# Patient Record
Sex: FEMALE | Race: WHITE | NOT HISPANIC OR LATINO | URBAN - NONMETROPOLITAN AREA
[De-identification: names, ages, dates, MRNs, and addresses within clinical notes are randomized per-mention and may not be internally consistent; named-entity substitution may affect disease eponyms.]

---

## 2018-04-12 ENCOUNTER — CLINICAL SUPPORT (OUTPATIENT)
Dept: FAMILY MEDICINE CLINIC | Facility: CLINIC | Age: 28
End: 2018-04-12

## 2018-04-12 VITALS
OXYGEN SATURATION: 98 % | HEART RATE: 84 BPM | WEIGHT: 205 LBS | BODY MASS INDEX: 36.32 KG/M2 | TEMPERATURE: 97.4 F | SYSTOLIC BLOOD PRESSURE: 134 MMHG | HEIGHT: 63 IN | DIASTOLIC BLOOD PRESSURE: 78 MMHG

## 2018-04-12 DIAGNOSIS — Z02.89 ENCOUNTER FOR EXAMINATION REQUIRED BY DEPARTMENT OF TRANSPORTATION (DOT): Primary | ICD-10-CM

## 2018-04-12 PROCEDURE — DOTPHY: Performed by: NURSE PRACTITIONER

## 2018-04-12 NOTE — PROGRESS NOTES
Brigitte Go is a 27 y.o. female who presents to the office for a DOT Exam. See Federal DOT examination form for details of this visit.

## 2018-04-17 ENCOUNTER — LAB (OUTPATIENT)
Dept: LAB | Facility: OTHER | Age: 28
End: 2018-04-17

## 2018-04-17 ENCOUNTER — OFFICE VISIT (OUTPATIENT)
Dept: FAMILY MEDICINE CLINIC | Facility: CLINIC | Age: 28
End: 2018-04-17

## 2018-04-17 VITALS
HEIGHT: 63 IN | WEIGHT: 206.6 LBS | DIASTOLIC BLOOD PRESSURE: 80 MMHG | TEMPERATURE: 98 F | BODY MASS INDEX: 36.61 KG/M2 | HEART RATE: 86 BPM | SYSTOLIC BLOOD PRESSURE: 130 MMHG

## 2018-04-17 DIAGNOSIS — D64.9 ANEMIA, UNSPECIFIED TYPE: ICD-10-CM

## 2018-04-17 DIAGNOSIS — R53.83 FATIGUE, UNSPECIFIED TYPE: Primary | ICD-10-CM

## 2018-04-17 DIAGNOSIS — R53.83 FATIGUE, UNSPECIFIED TYPE: ICD-10-CM

## 2018-04-17 DIAGNOSIS — E66.9 OBESITY (BMI 30-39.9): ICD-10-CM

## 2018-04-17 LAB
ALBUMIN SERPL-MCNC: 4.2 G/DL (ref 3.2–5.5)
ALBUMIN/GLOB SERPL: 1.2 G/DL (ref 1–3)
ALP SERPL-CCNC: 63 U/L (ref 15–121)
ALT SERPL W P-5'-P-CCNC: 19 U/L (ref 10–60)
ANION GAP SERPL CALCULATED.3IONS-SCNC: 9 MMOL/L (ref 5–15)
AST SERPL-CCNC: 19 U/L (ref 10–60)
BASOPHILS # BLD AUTO: 0.02 10*3/MM3 (ref 0–0.2)
BASOPHILS NFR BLD AUTO: 0.2 % (ref 0–2)
BILIRUB SERPL-MCNC: 0.6 MG/DL (ref 0.2–1)
BUN BLD-MCNC: 15 MG/DL (ref 8–25)
BUN/CREAT SERPL: 18.8 (ref 7–25)
CALCIUM SPEC-SCNC: 9.8 MG/DL (ref 8.4–10.8)
CHLORIDE SERPL-SCNC: 103 MMOL/L (ref 100–112)
CO2 SERPL-SCNC: 27 MMOL/L (ref 20–32)
CREAT BLD-MCNC: 0.8 MG/DL (ref 0.4–1.3)
DEPRECATED RDW RBC AUTO: 42.7 FL (ref 36.4–46.3)
EOSINOPHIL # BLD AUTO: 0.4 10*3/MM3 (ref 0–0.7)
EOSINOPHIL NFR BLD AUTO: 4.7 % (ref 0–7)
ERYTHROCYTE [DISTWIDTH] IN BLOOD BY AUTOMATED COUNT: 13.8 % (ref 11.5–14.5)
FERRITIN SERPL-MCNC: 21.3 NG/ML (ref 6.2–137)
FOLATE SERPL-MCNC: 13.6 NG/ML (ref 2.76–21)
GFR SERPL CREATININE-BSD FRML MDRD: 85 ML/MIN/1.73 (ref 71–165)
GLOBULIN UR ELPH-MCNC: 3.6 GM/DL (ref 2.5–4.6)
GLUCOSE BLD-MCNC: 90 MG/DL (ref 70–100)
HCT VFR BLD AUTO: 37.1 % (ref 35–45)
HGB BLD-MCNC: 11.9 G/DL (ref 12–15.5)
IRON 24H UR-MRATE: 45 MCG/DL (ref 37–170)
IRON SATN MFR SERPL: 12 % (ref 15–50)
LYMPHOCYTES # BLD AUTO: 2.12 10*3/MM3 (ref 0.6–4.2)
LYMPHOCYTES NFR BLD AUTO: 24.9 % (ref 10–50)
MCH RBC QN AUTO: 27.5 PG (ref 26.5–34)
MCHC RBC AUTO-ENTMCNC: 32.1 G/DL (ref 31.4–36)
MCV RBC AUTO: 85.7 FL (ref 80–98)
MONOCYTES # BLD AUTO: 0.68 10*3/MM3 (ref 0–0.9)
MONOCYTES NFR BLD AUTO: 8 % (ref 0–12)
NEUTROPHILS # BLD AUTO: 5.3 10*3/MM3 (ref 2–8.6)
NEUTROPHILS NFR BLD AUTO: 62.2 % (ref 37–80)
PLATELET # BLD AUTO: 325 10*3/MM3 (ref 150–450)
PMV BLD AUTO: 9.3 FL (ref 8–12)
POTASSIUM BLD-SCNC: 4.3 MMOL/L (ref 3.4–5.4)
PROT SERPL-MCNC: 7.8 G/DL (ref 6.7–8.2)
RBC # BLD AUTO: 4.33 10*6/MM3 (ref 3.77–5.16)
SODIUM BLD-SCNC: 139 MMOL/L (ref 134–146)
T4 FREE SERPL-MCNC: 0.81 NG/DL (ref 0.78–2.19)
TIBC SERPL-MCNC: 386 MCG/DL (ref 265–497)
TSH SERPL DL<=0.05 MIU/L-ACNC: 1.98 MIU/ML (ref 0.46–4.68)
VIT B12 BLD-MCNC: 759 PG/ML (ref 239–931)
WBC NRBC COR # BLD: 8.52 10*3/MM3 (ref 3.2–9.8)

## 2018-04-17 PROCEDURE — 99213 OFFICE O/P EST LOW 20 MIN: CPT | Performed by: NURSE PRACTITIONER

## 2018-04-17 PROCEDURE — 84439 ASSAY OF FREE THYROXINE: CPT | Performed by: NURSE PRACTITIONER

## 2018-04-17 PROCEDURE — 84481 FREE ASSAY (FT-3): CPT | Performed by: NURSE PRACTITIONER

## 2018-04-17 PROCEDURE — 84443 ASSAY THYROID STIM HORMONE: CPT | Performed by: NURSE PRACTITIONER

## 2018-04-17 PROCEDURE — 80053 COMPREHEN METABOLIC PANEL: CPT | Performed by: NURSE PRACTITIONER

## 2018-04-17 PROCEDURE — 82746 ASSAY OF FOLIC ACID SERUM: CPT | Performed by: NURSE PRACTITIONER

## 2018-04-17 PROCEDURE — 83540 ASSAY OF IRON: CPT | Performed by: NURSE PRACTITIONER

## 2018-04-17 PROCEDURE — 85025 COMPLETE CBC W/AUTO DIFF WBC: CPT | Performed by: NURSE PRACTITIONER

## 2018-04-17 PROCEDURE — 83550 IRON BINDING TEST: CPT | Performed by: NURSE PRACTITIONER

## 2018-04-17 PROCEDURE — 82728 ASSAY OF FERRITIN: CPT | Performed by: NURSE PRACTITIONER

## 2018-04-17 PROCEDURE — 82607 VITAMIN B-12: CPT | Performed by: NURSE PRACTITIONER

## 2018-04-17 NOTE — PROGRESS NOTES
Subjective   Brigitte Go is a 27 y.o. female. Patient here today with complaints of Establish Care and Anemia  pt here today with complaints of fatigue, reports hx of anemia. Has taken OTC vitamins without relief of symptoms. Has IUD for contraception.     Vitals:    18 0915   BP: 130/80   Pulse: 86   Temp: 98 °F (36.7 °C)     Past Medical History:   Diagnosis Date   • Obesity complicating pregnancy, childbirth, or puerperium, antepartum     Obesity complicating pregnancy, childbirth, or the puerperium, delivered, with or without mention of antepartum condition      • Postpartum care and examination      care status      • Vulvovaginitis      Fatigue   This is a new problem. The current episode started more than 1 month ago. The problem occurs constantly. The problem has been unchanged. Associated symptoms include fatigue. Pertinent negatives include no abdominal pain, anorexia, arthralgias, change in bowel habit, chest pain, chills, congestion, coughing, diaphoresis, fever, headaches, joint swelling, myalgias, nausea, neck pain, numbness, rash, sore throat, swollen glands, urinary symptoms, vertigo, visual change, vomiting or weakness. The symptoms are aggravated by stress. She has tried rest and sleep (OTC vitamins) for the symptoms. The treatment provided no relief.        The following portions of the patient's history were reviewed and updated as appropriate: allergies, current medications, past family history, past medical history, past social history, past surgical history and problem list.    Review of Systems   Constitutional: Positive for fatigue. Negative for chills, diaphoresis and fever.   HENT: Negative.  Negative for congestion and sore throat.    Eyes: Negative.    Respiratory: Negative.  Negative for cough.    Cardiovascular: Negative.  Negative for chest pain.   Gastrointestinal: Negative.  Negative for abdominal pain, anorexia, change in bowel habit, nausea and vomiting.    Endocrine: Negative.    Genitourinary: Negative.    Musculoskeletal: Negative.  Negative for arthralgias, joint swelling, myalgias and neck pain.   Skin: Negative.  Negative for rash.   Allergic/Immunologic: Negative.    Neurological: Negative.  Negative for vertigo, weakness, numbness and headaches.   Hematological: Negative.    Psychiatric/Behavioral: Negative.        Objective   Physical Exam   Constitutional: She is oriented to person, place, and time. She appears well-developed and well-nourished. No distress.   HENT:   Head: Normocephalic and atraumatic.   Neck: Neck supple. No thyromegaly present.   Cardiovascular: Normal rate, regular rhythm and normal heart sounds.  Exam reveals no gallop and no friction rub.    No murmur heard.  Pulmonary/Chest: Effort normal and breath sounds normal. She has no wheezes. She has no rales.   Abdominal: Soft. Bowel sounds are normal. She exhibits no distension and no mass. There is no tenderness. There is no rebound and no guarding. No hernia.   Lymphadenopathy:     She has no cervical adenopathy.   Neurological: She is alert and oriented to person, place, and time.   Skin: Skin is warm and dry. No rash noted. She is not diaphoretic. No erythema. No pallor.   Psychiatric: She has a normal mood and affect. Her behavior is normal.   Nursing note and vitals reviewed.      Assessment/Plan   Brigitte was seen today for establish care and anemia.    Diagnoses and all orders for this visit:    Fatigue, unspecified type  -     CBC & Differential; Future  -     Comprehensive metabolic panel; Future  -     TSH; Future  -     T4, Free; Future  -     T3, free; Future  -     Ferritin; Future  -     Folate; Future  -     Iron and TIBC; Future  -     Vitamin B12; Future    Obesity (BMI 30-39.9)    Anemia, unspecified type  Comments:  reports history of anemia    BMI 36.0-36.9,adult    Patient's Body mass index is 36.6 kg/m². BMI is 36.6.  I will obtain above labs and inform her of results,  if all negative she wants to consider weight management options   She will follow up after tests for results   She is aware and is in agreement to this plan   Can continue OTC vitamins daily at present   All questions and concerns are addressed with understanding noted.

## 2018-04-19 LAB — T3FREE SERPL-MCNC: 3.5 PG/ML (ref 2–4.4)

## 2018-04-23 ENCOUNTER — TELEPHONE (OUTPATIENT)
Dept: FAMILY MEDICINE CLINIC | Facility: CLINIC | Age: 28
End: 2018-04-23

## 2018-04-23 NOTE — TELEPHONE ENCOUNTER
----- Message from LUNA Cotton sent at 4/22/2018  2:46 PM CDT -----  Inform pt, start ferous sulfate 1 po qd and take with vit c. Repeat iron studies in 3 months

## 2018-06-26 ENCOUNTER — LAB REQUISITION (OUTPATIENT)
Dept: LAB | Facility: OTHER | Age: 28
End: 2018-06-26

## 2018-06-26 DIAGNOSIS — Z00.00 ROUTINE GENERAL MEDICAL EXAMINATION AT A HEALTH CARE FACILITY: ICD-10-CM

## 2018-06-26 PROCEDURE — UDS COCC - COLLECTION FEE ONLY: Performed by: INTERNAL MEDICINE

## 2018-10-25 ENCOUNTER — TRANSCRIBE ORDERS (OUTPATIENT)
Dept: LAB | Facility: OTHER | Age: 28
End: 2018-10-25

## 2018-10-25 ENCOUNTER — LAB (OUTPATIENT)
Dept: LAB | Facility: OTHER | Age: 28
End: 2018-10-25

## 2018-10-25 DIAGNOSIS — N92.5 OTHER SPECIFIED IRREGULAR MENSTRUATION: Primary | ICD-10-CM

## 2018-10-25 DIAGNOSIS — N94.12 DEEP DYSPAREUNIA: ICD-10-CM

## 2018-10-25 DIAGNOSIS — N92.5 OTHER SPECIFIED IRREGULAR MENSTRUATION: ICD-10-CM

## 2018-10-25 LAB
BASOPHILS # BLD AUTO: 0.02 10*3/MM3 (ref 0–0.2)
BASOPHILS NFR BLD AUTO: 0.2 % (ref 0–2)
CREAT BLD-MCNC: 0.92 MG/DL (ref 0.52–1.04)
DEPRECATED RDW RBC AUTO: 43 FL (ref 36.4–46.3)
EOSINOPHIL # BLD AUTO: 0.38 10*3/MM3 (ref 0–0.7)
EOSINOPHIL NFR BLD AUTO: 4.5 % (ref 0–7)
ERYTHROCYTE [DISTWIDTH] IN BLOOD BY AUTOMATED COUNT: 13.7 % (ref 11.5–14.5)
ERYTHROCYTE [SEDIMENTATION RATE] IN BLOOD: 35 MM/HR (ref 0–20)
GFR SERPL CREATININE-BSD FRML MDRD: 72 ML/MIN/1.73 (ref 71–165)
GLUCOSE BLD-MCNC: 94 MG/DL (ref 74–99)
HCT VFR BLD AUTO: 36.9 % (ref 35–45)
HGB BLD-MCNC: 11.5 G/DL (ref 12–15.5)
LYMPHOCYTES # BLD AUTO: 2.02 10*3/MM3 (ref 0.6–4.2)
LYMPHOCYTES NFR BLD AUTO: 23.8 % (ref 10–50)
MCH RBC QN AUTO: 27.6 PG (ref 26.5–34)
MCHC RBC AUTO-ENTMCNC: 31.2 G/DL (ref 31.4–36)
MCV RBC AUTO: 88.7 FL (ref 80–98)
MONOCYTES # BLD AUTO: 0.47 10*3/MM3 (ref 0–0.9)
MONOCYTES NFR BLD AUTO: 5.5 % (ref 0–12)
NEUTROPHILS # BLD AUTO: 5.61 10*3/MM3 (ref 2–8.6)
NEUTROPHILS NFR BLD AUTO: 66 % (ref 37–80)
PLATELET # BLD AUTO: 326 10*3/MM3 (ref 150–450)
PMV BLD AUTO: 8.9 FL (ref 8–12)
RBC # BLD AUTO: 4.16 10*6/MM3 (ref 3.77–5.16)
WBC NRBC COR # BLD: 8.5 10*3/MM3 (ref 3.2–9.8)

## 2018-10-25 PROCEDURE — 82565 ASSAY OF CREATININE: CPT | Performed by: INTERNAL MEDICINE

## 2018-10-25 PROCEDURE — 85025 COMPLETE CBC W/AUTO DIFF WBC: CPT | Performed by: INTERNAL MEDICINE

## 2018-10-25 PROCEDURE — 85651 RBC SED RATE NONAUTOMATED: CPT | Performed by: INTERNAL MEDICINE

## 2018-10-25 PROCEDURE — 82947 ASSAY GLUCOSE BLOOD QUANT: CPT | Performed by: INTERNAL MEDICINE

## 2018-10-25 PROCEDURE — 84443 ASSAY THYROID STIM HORMONE: CPT | Performed by: OBSTETRICS & GYNECOLOGY

## 2018-10-27 LAB — TSH SERPL-ACNC: 1.5 UIU/ML (ref 0.45–4.5)

## 2019-03-11 ENCOUNTER — CLINICAL SUPPORT (OUTPATIENT)
Dept: FAMILY MEDICINE CLINIC | Facility: CLINIC | Age: 29
End: 2019-03-11

## 2019-03-11 VITALS
HEIGHT: 63 IN | SYSTOLIC BLOOD PRESSURE: 112 MMHG | WEIGHT: 228 LBS | DIASTOLIC BLOOD PRESSURE: 70 MMHG | BODY MASS INDEX: 40.4 KG/M2 | HEART RATE: 101 BPM

## 2019-03-11 DIAGNOSIS — Z02.89 ENCOUNTER FOR EXAMINATION REQUIRED BY DEPARTMENT OF TRANSPORTATION (DOT): Primary | ICD-10-CM

## 2019-03-11 PROCEDURE — DOTPHY: Performed by: NURSE PRACTITIONER

## 2019-03-11 NOTE — PROGRESS NOTES
Brigitte Go is a 28 y.o. female who presents to the office for a DOT Exam. See Federal DOT examination form for details of this visit.

## 2019-04-08 ENCOUNTER — OFFICE VISIT (OUTPATIENT)
Dept: OBSTETRICS AND GYNECOLOGY | Facility: CLINIC | Age: 29
End: 2019-04-08

## 2019-04-08 VITALS
HEIGHT: 63 IN | HEART RATE: 82 BPM | BODY MASS INDEX: 39.73 KG/M2 | DIASTOLIC BLOOD PRESSURE: 74 MMHG | SYSTOLIC BLOOD PRESSURE: 138 MMHG | WEIGHT: 224.2 LBS

## 2019-04-08 DIAGNOSIS — Z30.431 IUD CHECK UP: ICD-10-CM

## 2019-04-08 DIAGNOSIS — R10.2 PELVIC PAIN: Primary | ICD-10-CM

## 2019-04-08 DIAGNOSIS — N89.8 VAGINAL LEUKORRHEA: ICD-10-CM

## 2019-04-08 DIAGNOSIS — N92.1 PROLONGED MENSTRUATION: ICD-10-CM

## 2019-04-08 PROCEDURE — 99214 OFFICE O/P EST MOD 30 MIN: CPT | Performed by: NURSE PRACTITIONER

## 2019-04-08 PROCEDURE — 87210 SMEAR WET MOUNT SALINE/INK: CPT | Performed by: NURSE PRACTITIONER

## 2019-04-08 RX ORDER — FLUCONAZOLE 150 MG/1
150 TABLET ORAL ONCE
Qty: 2 TABLET | Refills: 0 | Status: SHIPPED | OUTPATIENT
Start: 2019-04-08 | End: 2019-04-08

## 2019-04-08 RX ORDER — DOXYCYCLINE 100 MG/1
100 CAPSULE ORAL 2 TIMES DAILY
Qty: 14 CAPSULE | Refills: 0 | Status: SHIPPED | OUTPATIENT
Start: 2019-04-08 | End: 2019-04-15

## 2019-04-08 RX ORDER — METRONIDAZOLE 500 MG/1
500 TABLET ORAL 2 TIMES DAILY
Qty: 14 TABLET | Refills: 0 | Status: SHIPPED | OUTPATIENT
Start: 2019-04-08 | End: 2019-04-15

## 2019-04-08 NOTE — PROGRESS NOTES
"Subjective   Brigitte Go is a 28 y.o. female.     History of Present Illness   Pt presents with concerns about her Paragard IUD in relation to sharp LLQ pain x 1 month and irregular bleeding x 2 months. Pt states [facility] due to fear of weight gain on hormonal regimens. Pt had previously done fine with it and had regular periods. In the Fall, pt began having painful intercourse and LLQ pain. She saw Dr. Logan. She states she was told she had a \"mild case of PCOS but didn't need treatment since she was done having children\". She tells me Dr. Logan wanted to remove her IUD because her \"labs showed inflammation.\" Labs reviewed show elevated Sed Rate of 35. WBC 8.5. TSH WNL. No labs ordered that would indicate or evaluate PCOS. Pt also denies having an ultrasound.     Pt states she went 3 months without a period after 11/15/18. Then has bled every day for the last 2 months. Not very heavy but constant. Denies discharge. Refuses STI testing. Pain is described as sharp, LLQ , comes and goes throughout the day. Has not had any pain today.      The following portions of the patient's history were reviewed and updated as appropriate: allergies, current medications, past family history, past medical history, past social history, past surgical history and problem list.    Review of Systems   Constitutional: Negative.  Negative for chills and fever.   Respiratory: Negative.    Cardiovascular: Negative.    Gastrointestinal: Negative.  Negative for constipation, diarrhea, nausea and vomiting.   Genitourinary: Positive for dyspareunia, menstrual problem and pelvic pain. Negative for urinary incontinence, decreased urine volume, difficulty urinating, dysuria, flank pain, frequency, genital sores, hematuria, urgency, vaginal discharge and vaginal pain.   Neurological: Negative for dizziness, syncope and light-headedness.       Objective    Vitals:    04/08/19 0941   BP: " 138/74   Pulse: 82         04/08/19  0941   Weight: 102 kg (224 lb 3.2 oz)     Body mass index is 39.72 kg/m².    Physical Exam   Constitutional: She is oriented to person, place, and time. She appears well-developed and well-nourished. She is obese.  Cardiovascular: Normal rate, regular rhythm and normal heart sounds.   Pulmonary/Chest: Effort normal and breath sounds normal.   Abdominal: Soft. Bowel sounds are normal. There is tenderness in the left lower quadrant. There is no rigidity and no guarding.       Genitourinary: Uterus normal and cervix normal. There is no rash, tenderness, lesion or injury on the right labia. There is no rash, tenderness, lesion or injury on the left labia. Cervix does not exhibit motion tenderness. IUD strings visualized: 1.5cm out of cervical os. Right adnexum displays no mass, no tenderness and no fullness. Left adnexum displays no mass, no tenderness and no fullness. Vagina exhibits no lesion and no loss of rugae. No erythema, tenderness or bleeding in the vagina. No foreign body in the vagina. No signs of injury around the vagina. Vaginal discharge (copious, thin, yellow discharge, malodorous, wet prep obtained. Pt refuses STI testing. ) found.   Genitourinary Comments: Wet prep: positive for clue cells and WBC TNTC (sheets of WBC), no yeast buds, hyphae or trichomonads. Evaluated by JUAN Bailey. Tenderness not reproduced on exam.    Neurological: She is alert and oriented to person, place, and time.   Vitals reviewed.    Lab Results   Component Value Date    TSH 1.500 10/25/2018     Lab Results   Component Value Date    WBC 8.50 10/25/2018    HGB 11.5 (L) 10/25/2018    HCT 36.9 10/25/2018    MCV 88.7 10/25/2018     10/25/2018     Lab Results   Component Value Date    GLUCOSE 94 10/25/2018    BUN 15 04/17/2018    CREATININE 0.92 10/25/2018    EGFRIFNONA 72 10/25/2018    BCR 18.8 04/17/2018    K 4.3 04/17/2018    CO2 27.0 04/17/2018    CALCIUM 9.8 04/17/2018     ALBUMIN 4.20 04/17/2018    AST 19 04/17/2018    ALT 19 04/17/2018         Assessment/Plan   Brigitte was seen today for iud check.    Diagnoses and all orders for this visit:    Pelvic pain  -     US Non-ob Transvaginal; Future    Prolonged menstruation  -     US Non-ob Transvaginal; Future    IUD check up  -     US Non-ob Transvaginal; Future    Vaginal leukorrhea  -     fluconazole (DIFLUCAN) 150 MG tablet; Take 1 tablet by mouth 1 (One) Time for 1 dose. Repeat dose in 72 hours if still symptomatic  -     metroNIDAZOLE (FLAGYL) 500 MG tablet; Take 1 tablet by mouth 2 (Two) Times a Day for 7 days. No alcohol up to 48 hours after last dose  -     doxycycline (MONODOX) 100 MG capsule; Take 1 capsule by mouth 2 (Two) Times a Day for 7 days.    I discussed findings on exam and wet prep with pt. Consistent with BV from clue cells but excessive WBC indicates inflammation. I recommend treating with both Flagyl and Doxycycline. If on recheck it is persisting, we will have to check for STI. Take Diflucan on day 3 and 7 to prevent secondary yeast infection.     TVUS to confirm placement, evaluate endometrium and evaluate possible etiologies of LLQ. Scheduled for 4/12/19. I will call with results and discuss next steps.     Briefly discussed PCOS. While pt may have it, outside of irregular bleeding, pt has no other symptoms, nor has she had any diagnostic testing that correlate with this diagnosis.     Pt would prefer to leave IUD in if possible and avoid hormonal measures due to fear of weight gain. I discussed that we may have to do short term hormonal regimens to regulate her cycles. Pt voices understanding.

## 2020-05-11 ENCOUNTER — LAB (OUTPATIENT)
Dept: LAB | Facility: OTHER | Age: 30
End: 2020-05-11

## 2020-05-11 ENCOUNTER — TELEMEDICINE (OUTPATIENT)
Dept: FAMILY MEDICINE CLINIC | Facility: CLINIC | Age: 30
End: 2020-05-11

## 2020-05-11 DIAGNOSIS — Z82.49 FAMILY HISTORY OF PULMONARY EMBOLISM: ICD-10-CM

## 2020-05-11 DIAGNOSIS — R07.89 CHEST WALL PAIN: ICD-10-CM

## 2020-05-11 DIAGNOSIS — R07.81 PLEURITIC PAIN: ICD-10-CM

## 2020-05-11 DIAGNOSIS — R07.89 CHEST WALL PAIN: Primary | ICD-10-CM

## 2020-05-11 LAB
ALBUMIN SERPL-MCNC: 4.1 G/DL (ref 3.5–5)
ALBUMIN/GLOB SERPL: 1.2 G/DL (ref 1.1–1.8)
ALP SERPL-CCNC: 59 U/L (ref 38–126)
ALT SERPL W P-5'-P-CCNC: 17 U/L
ANION GAP SERPL CALCULATED.3IONS-SCNC: 6 MMOL/L (ref 5–15)
AST SERPL-CCNC: 19 U/L (ref 14–36)
BASOPHILS # BLD AUTO: 0.03 10*3/MM3 (ref 0–0.2)
BASOPHILS NFR BLD AUTO: 0.4 % (ref 0–1.5)
BILIRUB SERPL-MCNC: 0.1 MG/DL (ref 0.2–1.3)
BUN BLD-MCNC: 15 MG/DL (ref 7–23)
BUN/CREAT SERPL: 17.2 (ref 7–25)
CALCIUM SPEC-SCNC: 9.1 MG/DL (ref 8.4–10.2)
CHLORIDE SERPL-SCNC: 108 MMOL/L (ref 101–112)
CO2 SERPL-SCNC: 27 MMOL/L (ref 22–30)
CREAT BLD-MCNC: 0.87 MG/DL (ref 0.52–1.04)
D-DIMER, QUANTITATIVE (MAD,POW, STR): 325 NG/ML (FEU)
DEPRECATED RDW RBC AUTO: 46.1 FL (ref 37–54)
EOSINOPHIL # BLD AUTO: 0.22 10*3/MM3 (ref 0–0.4)
EOSINOPHIL NFR BLD AUTO: 3.2 % (ref 0.3–6.2)
ERYTHROCYTE [DISTWIDTH] IN BLOOD BY AUTOMATED COUNT: 14.5 % (ref 12.3–15.4)
GFR SERPL CREATININE-BSD FRML MDRD: 76 ML/MIN/1.73 (ref 64–149)
GLOBULIN UR ELPH-MCNC: 3.5 GM/DL (ref 2.3–3.5)
GLUCOSE BLD-MCNC: 102 MG/DL (ref 70–99)
HCT VFR BLD AUTO: 36.7 % (ref 34–46.6)
HGB BLD-MCNC: 11.5 G/DL (ref 12–15.9)
LYMPHOCYTES # BLD AUTO: 1.5 10*3/MM3 (ref 0.7–3.1)
LYMPHOCYTES NFR BLD AUTO: 21.6 % (ref 19.6–45.3)
MCH RBC QN AUTO: 27.6 PG (ref 26.6–33)
MCHC RBC AUTO-ENTMCNC: 31.3 G/DL (ref 31.5–35.7)
MCV RBC AUTO: 88 FL (ref 79–97)
MONOCYTES # BLD AUTO: 0.51 10*3/MM3 (ref 0.1–0.9)
MONOCYTES NFR BLD AUTO: 7.3 % (ref 5–12)
NEUTROPHILS # BLD AUTO: 4.68 10*3/MM3 (ref 1.7–7)
NEUTROPHILS NFR BLD AUTO: 67.5 % (ref 42.7–76)
PLATELET # BLD AUTO: 311 10*3/MM3 (ref 140–450)
PMV BLD AUTO: 9 FL (ref 6–12)
POTASSIUM BLD-SCNC: 4.4 MMOL/L (ref 3.4–5)
PROT SERPL-MCNC: 7.6 G/DL (ref 6.3–8.6)
RBC # BLD AUTO: 4.17 10*6/MM3 (ref 3.77–5.28)
SODIUM BLD-SCNC: 141 MMOL/L (ref 137–145)
WBC NRBC COR # BLD: 6.94 10*3/MM3 (ref 3.4–10.8)

## 2020-05-11 PROCEDURE — 85379 FIBRIN DEGRADATION QUANT: CPT | Performed by: NURSE PRACTITIONER

## 2020-05-11 PROCEDURE — 80053 COMPREHEN METABOLIC PANEL: CPT | Performed by: NURSE PRACTITIONER

## 2020-05-11 PROCEDURE — 85025 COMPLETE CBC W/AUTO DIFF WBC: CPT | Performed by: NURSE PRACTITIONER

## 2020-05-11 PROCEDURE — 99213 OFFICE O/P EST LOW 20 MIN: CPT | Performed by: NURSE PRACTITIONER

## 2020-05-11 RX ORDER — METHYLPREDNISOLONE 4 MG/1
TABLET ORAL
Qty: 1 EACH | Refills: 0 | Status: SHIPPED | OUTPATIENT
Start: 2020-05-11 | End: 2020-08-11

## 2020-05-11 RX ORDER — IBUPROFEN 800 MG/1
800 TABLET ORAL EVERY 6 HOURS PRN
Qty: 60 TABLET | Refills: 0 | Status: SHIPPED | OUTPATIENT
Start: 2020-05-11 | End: 2020-08-11

## 2020-05-11 NOTE — PROGRESS NOTES
Subjective   Brigitte Go is a 29 y.o. female. Patient here today with complaints of Chest Pain  pt contacted the office today for video visit with complaints of L sided chest wall pain, worse with deep breathing, lying on L side, denies shortness of breath, denies fever, reports is sharp in nature, used tylenol which did not help this pain. Denies smoking, does report a family hx of PE, DVT, states sharp pain worse at night, she has 3 children, denies being on OCPs or HRT, has paragard.     There were no vitals filed for this visit.  There is no height or weight on file to calculate BMI.  Past Medical History:   Diagnosis Date   • Obesity complicating pregnancy, childbirth, or puerperium, antepartum     Obesity complicating pregnancy, childbirth, or the puerperium, delivered, with or without mention of antepartum condition      • Postpartum care and examination      care status      • Urinary tract infection    • Vulvovaginitis      Chest Pain    This is a new problem. The current episode started in the past 7 days. The problem occurs intermittently. The problem has been waxing and waning. The pain is present in the lateral region. The pain is moderate. The quality of the pain is described as sharp. The pain radiates to the left arm. Pertinent negatives include no cough, fever, irregular heartbeat, leg pain, orthopnea, palpitations, PND or shortness of breath. The pain is aggravated by deep breathing. She has tried rest and acetaminophen for the symptoms. The treatment provided no relief. Risk factors include obesity.   Her family medical history is significant for PE. Prior diagnostic workup includes chest x-ray.        The following portions of the patient's history were reviewed and updated as appropriate: allergies, current medications, past family history, past medical history, past social history, past surgical history and problem list.    Review of Systems   Constitutional: Negative.   Negative for fever.   HENT: Negative.    Eyes: Negative.    Respiratory: Negative.  Negative for cough and shortness of breath.    Cardiovascular: Positive for chest pain. Negative for palpitations, orthopnea and PND.   Gastrointestinal: Negative.    Endocrine: Negative.    Genitourinary: Negative.    Musculoskeletal: Negative.    Skin: Negative.    Allergic/Immunologic: Negative.    Neurological: Negative.    Hematological: Negative.    Psychiatric/Behavioral: Negative.        Objective   Physical Exam   Constitutional: She appears well-developed and well-nourished. No distress.   HENT:   Head: Normocephalic and atraumatic.   Right Ear: Hearing and external ear normal.   Left Ear: Hearing and external ear normal.   Eyes: Pupils are equal, round, and reactive to light. Conjunctivae and EOM are normal.   Pulmonary/Chest: Effort normal.   Musculoskeletal: Normal range of motion.   Neurological: She is alert. No cranial nerve deficit. Coordination normal.   Skin: No rash noted. She is not diaphoretic. No erythema. No pallor.   Psychiatric: She has a normal mood and affect.     Physical Exam   Constitutional: She appears well-developed and well-nourished. No distress.   HENT:   Head: Normocephalic and atraumatic.   Right Ear: Hearing and external ear normal.   Left Ear: Hearing and external ear normal.   Eyes: Pupils are equal, round, and reactive to light. Conjunctivae and EOM are normal.   Neck: Neck normal appearance.  Pulmonary/Chest: Effort normal.   Musculoskeletal: Normal range of motion.   Neurological: She is alert. No cranial nerve deficit. Coordination normal.   Skin: No rash noted. She is not diaphoretic. No erythema. No pallor.   Psychiatric: She has a normal mood and affect. She mood appears normal. Her affect is normal. Her behavior is normal. Thought content is normal. She does not express abnormal judgement.       Assessment/Plan   Brigitte was seen today for chest pain.    Diagnoses and all orders for  this visit:    Chest wall pain  -     CBC & Differential; Future  -     XR Chest 2 View  -     D-dimer, Quantitative; Future  -     Comprehensive metabolic panel; Future    Pleuritic pain  -     CBC & Differential; Future  -     XR Chest 2 View  -     D-dimer, Quantitative; Future  -     Comprehensive metabolic panel; Future    Family history of pulmonary embolism  -     XR Chest 2 View  -     D-dimer, Quantitative; Future    Other orders  -     methylPREDNISolone (MEDROL, FRANCISCA,) 4 MG tablet; Take as directed on package instructions.  -     ibuprofen (ADVIL,MOTRIN) 800 MG tablet; Take 1 tablet by mouth Every 6 (Six) Hours As Needed for Mild Pain .    she is advised to have stat labs and cxr as above, will inform of results via phone, she states she will come in to have these drawn as soon as she can in hopes that I will receive results today, she is treated with nsaids and po steroids as above, if symptoms worsen advised to go to ER. Will otherwise let me know if symptoms persist. She is aware and is in agreement to this plan. All questions and concerns are addressed with understanding noted. You have chosen to receive care through a telehealth visit.  Do you consent to use a video/audio connection for your medical care today? yes

## 2020-08-11 ENCOUNTER — OFFICE VISIT (OUTPATIENT)
Dept: FAMILY MEDICINE CLINIC | Facility: CLINIC | Age: 30
End: 2020-08-11

## 2020-08-11 DIAGNOSIS — F41.9 ANXIETY: ICD-10-CM

## 2020-08-11 DIAGNOSIS — R45.86 MOOD SWINGS: ICD-10-CM

## 2020-08-11 DIAGNOSIS — F33.0 MILD EPISODE OF RECURRENT MAJOR DEPRESSIVE DISORDER (HCC): Primary | ICD-10-CM

## 2020-08-11 PROCEDURE — 99443 PR PHYS/QHP TELEPHONE EVALUATION 21-30 MIN: CPT | Performed by: NURSE PRACTITIONER

## 2020-08-11 RX ORDER — ESCITALOPRAM OXALATE 10 MG/1
10 TABLET ORAL DAILY
Qty: 30 TABLET | Refills: 1 | Status: SHIPPED | OUTPATIENT
Start: 2020-08-11 | End: 2020-09-14 | Stop reason: SDUPTHER

## 2020-08-11 NOTE — PROGRESS NOTES
"Subjective   Brigitte Go is a 29 y.o. female. Patient here today with complaints of Depression  pt here today for telehealth visit with complaints of low energy, trouble falling asleep but no trouble staying asleep, sadness, crying, anxiety, aches, states she has had these symptoms for years for they are worsening, denies suicidal/homicidal ideations. States she has \"good days and bad days\" but bad days outweigh the good. She has three children , 8 yo, 9 yo and 3 yo. She denies family hx mental health disorders that are being medicated. Has not seen counselors , states she does not have a sitter to allow her to go . She has tried melatonin for sleep which caused her to feel too sedated. Thinks she had postpartum depression after her first child but was not medicated at that time. She denies tobacco, alcohol, rec drug use. States she feels \"overwhelmed\".     There were no vitals filed for this visit.  There is no height or weight on file to calculate BMI.  Past Medical History:   Diagnosis Date   • Obesity complicating pregnancy, childbirth, or puerperium, antepartum     Obesity complicating pregnancy, childbirth, or the puerperium, delivered, with or without mention of antepartum condition      • Postpartum care and examination      care status      • Urinary tract infection    • Vulvovaginitis      Depression   Visit Type: initial  Onset of symptoms: at an unknown time  Progression since onset: gradually worsening  Patient presents with the following symptoms: depressed mood, excessive worry, insomnia, irritability, nervousness/anxiety and psychomotor retardation.  Patient is not experiencing: shortness of breath, suicidal ideas, suicidal planning and thoughts of death.  Frequency of symptoms: most days   Severity: moderate   Sleep quality: poor  Nighttime awakenings: none  Treatment tried: lifestyle changes (melatonin OTC )      Anxiety   Presents for initial visit. Onset was at an unknown time. " Symptoms include depressed mood, excessive worry, insomnia, irritability and nervous/anxious behavior. Patient reports no shortness of breath or suicidal ideas.     Her past medical history is significant for depression. Past treatments include lifestyle changes (melatonin OTC).        The following portions of the patient's history were reviewed and updated as appropriate: allergies, current medications, past family history, past medical history, past social history, past surgical history and problem list.    Review of Systems   Constitutional: Positive for irritability.   HENT: Negative.    Eyes: Negative.    Respiratory: Negative.  Negative for shortness of breath.    Cardiovascular: Negative.    Gastrointestinal: Negative.    Endocrine: Negative.    Genitourinary: Negative.    Musculoskeletal: Negative.    Skin: Negative.    Allergic/Immunologic: Negative.    Neurological: Negative.    Hematological: Negative.    Psychiatric/Behavioral: Positive for sleep disturbance. Negative for self-injury and suicidal ideas. The patient is nervous/anxious and has insomnia.        Objective   Physical Exam  Deferred, telephone visit   Assessment/Plan   Brigitte was seen today for depression.    Diagnoses and all orders for this visit:    Mild episode of recurrent major depressive disorder (CMS/HCC)    Mood swings    Anxiety    Other orders  -     escitalopram (Lexapro) 10 MG tablet; Take 1 tablet by mouth Daily.    You have chosen to receive care through a telephone visit. Do you consent to use a telephone visit for your medical care today? Yes  This visit has been rescheduled as a phone visit to comply with patient safety concerns in accordance with CDC recommendations. Total time of discussion was 23 minutes.  Will start her on lexapro as above, advised on potential side effects of medicines   Follow up in 1 month for recheck, sooner if nec  She is offered but declined counseling , will continue to encourage  All questions  and concerns are addressed with understanding noted  She is aware and is in agreement to this plan

## 2020-09-14 ENCOUNTER — OFFICE VISIT (OUTPATIENT)
Dept: FAMILY MEDICINE CLINIC | Facility: CLINIC | Age: 30
End: 2020-09-14

## 2020-09-14 DIAGNOSIS — F33.0 MILD EPISODE OF RECURRENT MAJOR DEPRESSIVE DISORDER (HCC): Primary | ICD-10-CM

## 2020-09-14 DIAGNOSIS — F41.9 ANXIETY: ICD-10-CM

## 2020-09-14 DIAGNOSIS — R45.86 MOOD SWINGS: ICD-10-CM

## 2020-09-14 PROCEDURE — 99442 PR PHYS/QHP TELEPHONE EVALUATION 11-20 MIN: CPT | Performed by: NURSE PRACTITIONER

## 2020-09-14 RX ORDER — ESCITALOPRAM OXALATE 10 MG/1
10 TABLET ORAL DAILY
Qty: 30 TABLET | Refills: 5 | Status: SHIPPED | OUTPATIENT
Start: 2020-09-14 | End: 2021-01-26

## 2020-09-14 NOTE — PROGRESS NOTES
"Subjective   Brigitte Go is a 29 y.o. female. Patient here today with complaints of Depression  pt here today for telehealth visit for recheck of depression, anxiety and mood swings. States since she started on lexapro she is \"100%\" better, she denies side effects of the medicine, reports increase in energy, denies thoughts of suicide/homicide.     There were no vitals filed for this visit.  There is no height or weight on file to calculate BMI.  Past Medical History:   Diagnosis Date   • Obesity complicating pregnancy, childbirth, or puerperium, antepartum     Obesity complicating pregnancy, childbirth, or the puerperium, delivered, with or without mention of antepartum condition      • Postpartum care and examination      care status      • Urinary tract infection    • Vulvovaginitis      Depression  Visit Type: follow-up  Patient is not experiencing: excessive worry, suicidal ideas, suicidal planning and thoughts of death.  Frequency of symptoms: occasionally   Severity: mild   Sleep quality: good  Nighttime awakenings: occasional  Compliance with medications:  %             The following portions of the patient's history were reviewed and updated as appropriate: allergies, current medications, past family history, past medical history, past social history, past surgical history and problem list.    Review of Systems   Constitutional: Negative.    HENT: Negative.    Eyes: Negative.    Respiratory: Negative.    Cardiovascular: Negative.    Gastrointestinal: Negative.    Endocrine: Negative.    Genitourinary: Negative.    Musculoskeletal: Negative.    Skin: Negative.    Allergic/Immunologic: Negative.    Neurological: Negative.    Hematological: Negative.    Psychiatric/Behavioral: Negative.  Negative for self-injury, sleep disturbance and suicidal ideas.       Objective   Physical Exam  Deferred, telephone visit  Assessment/Plan   Brigitte was seen today for depression.    Diagnoses and all " orders for this visit:    Mild episode of recurrent major depressive disorder (CMS/HCC)    Mood swings    Anxiety    Other orders  -     escitalopram (Lexapro) 10 MG tablet; Take 1 tablet by mouth Daily.    she is given refills on lexapro as above, tolerating well, follow up in 6 months for recheck or sooner if nec  She is again offered but declines numbers and names of counselors, states will call back for them if she changes her mind  All questions and concerns are addressed with understanding noted  She is aware and is in agreement to this plan  You have chosen to receive care through a telephone visit. Do you consent to use a telephone visit for your medical care today? Yes  This visit has been rescheduled as a phone visit to comply with patient safety concerns in accordance with CDC recommendations. Total time of discussion was 16 minutes.

## 2020-10-29 ENCOUNTER — OFFICE VISIT (OUTPATIENT)
Dept: FAMILY MEDICINE CLINIC | Facility: CLINIC | Age: 30
End: 2020-10-29

## 2020-10-29 VITALS
SYSTOLIC BLOOD PRESSURE: 140 MMHG | WEIGHT: 225.6 LBS | BODY MASS INDEX: 39.97 KG/M2 | TEMPERATURE: 98.8 F | HEIGHT: 63 IN | HEART RATE: 110 BPM | DIASTOLIC BLOOD PRESSURE: 72 MMHG

## 2020-10-29 DIAGNOSIS — L98.9 SKIN LESION: Primary | ICD-10-CM

## 2020-10-29 DIAGNOSIS — L40.9 PSORIASIS: ICD-10-CM

## 2020-10-29 PROCEDURE — 99213 OFFICE O/P EST LOW 20 MIN: CPT | Performed by: NURSE PRACTITIONER

## 2020-10-29 RX ORDER — CLOBETASOL PROPIONATE 0.5 MG/G
AEROSOL, FOAM TOPICAL 2 TIMES DAILY
Qty: 100 G | Refills: 2 | Status: SHIPPED | OUTPATIENT
Start: 2020-10-29 | End: 2021-01-26

## 2020-10-29 NOTE — PROGRESS NOTES
"Subjective   Brigitte Go is a 30 y.o. female. Patient here today with complaints of Hand Pain and Psoriasis  pt here today for complaints of skin lesion on palm of L hand, present x 2 years, reports she has concern about basal cell skin cancer because area will not heal, does not itch or bleed but does occasionally drain and become red at times. Also complaining of psoriasis/rash on base of posterior scalp and neck , present x years as well, has used medicine in the past which have helped but not resolved her problems.     Vitals:    10/29/20 1414   BP: 140/72   Pulse: 110   Temp: 98.8 °F (37.1 °C)   Weight: 102 kg (225 lb 9.6 oz)   Height: 160 cm (63\")   PainSc: 0-No pain     Body mass index is 39.96 kg/m².  Past Medical History:   Diagnosis Date   • Obesity complicating pregnancy, childbirth, or puerperium, antepartum     Obesity complicating pregnancy, childbirth, or the puerperium, delivered, with or without mention of antepartum condition      • Postpartum care and examination      care status      • Urinary tract infection    • Vulvovaginitis      Hand Pain   The incident occurred more than 1 week ago. There was no injury mechanism. The patient is experiencing no pain. Nothing aggravates the symptoms.   Psoriasis  This is a recurrent problem. The current episode started more than 1 year ago. The problem occurs constantly. The problem has been waxing and waning since onset. The affected locations include the scalp and head. Associated symptoms include itching and plaques. Past treatments include topical steroids. The treatment provided mild relief.        The following portions of the patient's history were reviewed and updated as appropriate: allergies, current medications, past family history, past medical history, past social history, past surgical history and problem list.    Review of Systems   Constitutional: Negative.    HENT: Negative.    Eyes: Negative.    Respiratory: Negative.  "   Cardiovascular: Negative.    Gastrointestinal: Negative.    Endocrine: Negative.    Genitourinary: Negative.    Musculoskeletal: Negative.    Skin: Positive for itching and rash.   Allergic/Immunologic: Negative.    Neurological: Negative.    Hematological: Negative.    Psychiatric/Behavioral: Negative.        Objective   Physical Exam  Vitals signs and nursing note reviewed.   Constitutional:       General: She is not in acute distress.     Appearance: Normal appearance. She is obese. She is not ill-appearing, toxic-appearing or diaphoretic.   Cardiovascular:      Rate and Rhythm: Normal rate and regular rhythm.      Heart sounds: Normal heart sounds. No murmur. No friction rub. No gallop.    Pulmonary:      Effort: Pulmonary effort is normal. No respiratory distress.      Breath sounds: Normal breath sounds. No stridor. No wheezing, rhonchi or rales.   Skin:     General: Skin is warm and dry.      Findings: Erythema and rash present.             Comments: She has plaque like erythema in circular appearance on back of scalp as indicated on graph, sl itching, not draining. Also has a small, skin colored lesion , circular on L palm at base of thumb which is not erythematous, tender or draining , center sl dark in color    Neurological:      Mental Status: She is alert and oriented to person, place, and time.   Psychiatric:         Mood and Affect: Mood normal.         Behavior: Behavior normal.         Thought Content: Thought content normal.         Judgment: Judgment normal.         Assessment/Plan   Diagnoses and all orders for this visit:    1. Skin lesion (Primary)  -     Ambulatory Referral to Dermatology    2. Psoriasis  -     clobetasol (Olux) 0.05 % topical foam; Apply  topically to the appropriate area as directed 2 (Two) Times a Day.  Dispense: 100 g; Refill: 2  -     Ambulatory Referral to Dermatology    she is prescribed olux foam for scalp, she is referred to derm for further eval/treatment , removal  of skin lesion on palm as they deem nec and can also follow up with them for psoriasis as well if not improved, resolved. She is to RTC here prn problems or as scheduled for chronic conditions. She is aware and is in agreement to this plan. All questions and concerns are addressed with understanding noted.

## 2021-01-25 DIAGNOSIS — R30.0 DYSURIA: Primary | ICD-10-CM

## 2021-01-26 ENCOUNTER — OFFICE VISIT (OUTPATIENT)
Dept: FAMILY MEDICINE CLINIC | Facility: CLINIC | Age: 31
End: 2021-01-26

## 2021-01-26 ENCOUNTER — LAB (OUTPATIENT)
Dept: LAB | Facility: OTHER | Age: 31
End: 2021-01-26

## 2021-01-26 DIAGNOSIS — N30.00 ACUTE CYSTITIS WITHOUT HEMATURIA: Primary | ICD-10-CM

## 2021-01-26 DIAGNOSIS — L40.9 PSORIASIS: ICD-10-CM

## 2021-01-26 DIAGNOSIS — R30.0 DYSURIA: ICD-10-CM

## 2021-01-26 LAB
BACTERIA UR QL AUTO: ABNORMAL /HPF
BILIRUB UR QL STRIP: NEGATIVE
CLARITY UR: ABNORMAL
COLOR UR: YELLOW
GLUCOSE UR STRIP-MCNC: NEGATIVE MG/DL
HGB UR QL STRIP.AUTO: ABNORMAL
HYALINE CASTS UR QL AUTO: ABNORMAL /LPF
KETONES UR QL STRIP: NEGATIVE
LEUKOCYTE ESTERASE UR QL STRIP.AUTO: ABNORMAL
MUCOUS THREADS URNS QL MICRO: ABNORMAL /HPF
NITRITE UR QL STRIP: POSITIVE
PH UR STRIP.AUTO: 5.5 [PH] (ref 5.5–8)
PROT UR QL STRIP: NEGATIVE
RBC # UR: ABNORMAL /HPF
REF LAB TEST METHOD: ABNORMAL
SP GR UR STRIP: >=1.03 (ref 1–1.03)
SQUAMOUS #/AREA URNS HPF: ABNORMAL /HPF
UROBILINOGEN UR QL STRIP: ABNORMAL
WBC UR QL AUTO: ABNORMAL /HPF

## 2021-01-26 PROCEDURE — 81001 URINALYSIS AUTO W/SCOPE: CPT | Performed by: NURSE PRACTITIONER

## 2021-01-26 PROCEDURE — 87086 URINE CULTURE/COLONY COUNT: CPT | Performed by: NURSE PRACTITIONER

## 2021-01-26 PROCEDURE — 99443 PR PHYS/QHP TELEPHONE EVALUATION 21-30 MIN: CPT | Performed by: NURSE PRACTITIONER

## 2021-01-26 PROCEDURE — 87186 SC STD MICRODIL/AGAR DIL: CPT | Performed by: NURSE PRACTITIONER

## 2021-01-26 RX ORDER — CLOBETASOL PROPIONATE 0.5 MG/G
AEROSOL, FOAM TOPICAL 2 TIMES DAILY
Qty: 100 G | Refills: 2 | Status: SHIPPED | OUTPATIENT
Start: 2021-01-26 | End: 2021-03-24

## 2021-01-26 RX ORDER — SULFAMETHOXAZOLE AND TRIMETHOPRIM 800; 160 MG/1; MG/1
1 TABLET ORAL 2 TIMES DAILY
Qty: 14 TABLET | Refills: 0 | Status: SHIPPED | OUTPATIENT
Start: 2021-01-26 | End: 2021-02-02

## 2021-01-26 NOTE — PROGRESS NOTES
Chief Complaint  Urinary Tract Infection, Urinary Frequency, and Difficulty Urinating pt here today for telehealth visit with complaints of dysuria and increased frequency of urination. Had UA and is here for those results.  Also here for recheck of psoriasis, has been on olux in the past but states when I prescribed this to her it was not covered by insurance although she has used and has worked well for her in the past. Needing refill again   You have chosen to receive care through a telephone visit. Do you consent to use a telephone visit for your medical care today? Yes  This visit has been rescheduled as a phone visit to comply with patient safety concerns in accordance with CDC recommendations. Total time of discussion was 30 minutes.      Subjective          Brigitte Go presents to [facility] for   Urinary Tract Infection   This is a new problem. The current episode started in the past 7 days. The problem has been unchanged. The quality of the pain is described as aching and burning. The pain is mild. There has been no fever. Associated symptoms include frequency and urgency. Pertinent negatives include no flank pain, nausea or vomiting. She has tried increased fluids for the symptoms. The treatment provided no relief.   Urinary Frequency   This is a new problem. The current episode started in the past 7 days. The problem has been waxing and waning. The quality of the pain is described as burning and aching. The pain is mild. There has been no fever. Associated symptoms include frequency and urgency. Pertinent negatives include no flank pain, nausea or vomiting. She has tried increased fluids for the symptoms. The treatment provided no relief.   Difficulty Urinating  This is a new problem. The current episode started in the past 7 days. The problem occurs constantly. The problem has been unchanged. Associated symptoms include a rash. Pertinent negatives include  no fever, nausea or vomiting. Nothing aggravates the symptoms. She has tried drinking for the symptoms. The treatment provided no relief.   Rash  This is a chronic problem. The current episode started more than 1 year ago. The problem has been waxing and waning since onset. The affected locations include the scalp. The rash is characterized by dryness, scaling, redness and itchiness. Pertinent negatives include no fever or vomiting. Past treatments include topical steroids. The treatment provided moderate relief. Eczema: psoriasis.       Objective   Vital Signs:   There were no vitals taken for this visit.    Physical Exam   Result Review :     UA    Urinalysis 1/26/21 1/26/21    [id number]   Squamous Epithelial Cells, UA  None Seen   Specific Gravity, UA >=1.030    Ketones, UA Negative    Blood, UA Trace (A)    Leukocytes, UA Small (1+) (A)    Nitrite, UA Positive (A)    RBC, UA  3-5 (A)   WBC, UA  21-30 (A)   Bacteria, UA  3+ (A)   (A) Abnormal value                      Assessment and Plan    Problem List Items Addressed This Visit     None      Visit Diagnoses     Acute cystitis without hematuria    -  Primary    Psoriasis        Relevant Medications    clobetasol (Olux) 0.05 % topical foam          Follow Up   Return if symptoms worsen or fail to improve, for Recheck, or sooner as needed.  Patient was given instructions and counseling regarding her condition or for health maintenance advice. Please see specific information pulled into the AVS if appropriate.   UA reviewed, discussed results with patient, culture pending  She is advised to push clear fluids including water and cranberry juice and will be treated with Bactrim DS 1 p.o. twice daily x7 days  If symptoms should persist or worsen she is asked to return here for follow-up.  All questions and concerns addressed with understanding verbalized  Patient aware and in agreement to this plan.

## 2021-01-28 LAB — BACTERIA SPEC AEROBE CULT: ABNORMAL

## 2021-03-24 ENCOUNTER — OFFICE VISIT (OUTPATIENT)
Dept: FAMILY MEDICINE CLINIC | Facility: CLINIC | Age: 31
End: 2021-03-24

## 2021-03-24 ENCOUNTER — LAB (OUTPATIENT)
Dept: LAB | Facility: OTHER | Age: 31
End: 2021-03-24

## 2021-03-24 VITALS
TEMPERATURE: 97.1 F | HEIGHT: 63 IN | BODY MASS INDEX: 42.17 KG/M2 | HEART RATE: 86 BPM | WEIGHT: 238 LBS | DIASTOLIC BLOOD PRESSURE: 70 MMHG | SYSTOLIC BLOOD PRESSURE: 110 MMHG

## 2021-03-24 DIAGNOSIS — Z71.83 ENCOUNTER FOR NONPROCREATIVE GENETIC COUNSELING: Primary | ICD-10-CM

## 2021-03-24 DIAGNOSIS — Z71.83 ENCOUNTER FOR NONPROCREATIVE GENETIC COUNSELING: ICD-10-CM

## 2021-03-24 PROCEDURE — 99212 OFFICE O/P EST SF 10 MIN: CPT | Performed by: NURSE PRACTITIONER

## 2021-03-24 PROCEDURE — 81243 FMR1 GEN ALY DETC ABNL ALLEL: CPT | Performed by: NURSE PRACTITIONER

## 2021-03-24 NOTE — PROGRESS NOTES
"Patient here today Chief Complaint  Follow-up    Subjective    Patient here today stating that she is needing lab work to see if she is a carrier for Fragile X.  She says that she has a 9-year-old son with \"severe ADHD and developmental delay \".  He has a learning disability.  States that he was diagnosed at 6 years old and now her 5-year-old son is acting the same way that her 9-year-old son is.  The 9-year-old was diagnosed at the [facility] in [place] and 5-year-old is on a waiting list to be screened as well.  She also has a 10-year-old daughter who has no issues.  Patient has been told that her sons needed to be tested but they have a phobia of needles she tried to get them tested she says which \"did not go well \".  Asking that she be tested note and if positive will go forward with having her children tested as well.        Brigitte Go presents to [facility]  History of Present Illness    Objective   Vital Signs:   /70   Pulse 86   Temp 97.1 °F (36.2 °C)   Ht 160 cm (63\")   Wt 108 kg (238 lb)   BMI 42.16 kg/m²     Physical Exam  Vitals and nursing note reviewed.   Constitutional:       Appearance: Normal appearance. She is obese.   HENT:      Head: Normocephalic and atraumatic.   Cardiovascular:      Rate and Rhythm: Normal rate.   Pulmonary:      Effort: Pulmonary effort is normal.   Neurological:      Mental Status: She is alert and oriented to person, place, and time.   Psychiatric:         Mood and Affect: Mood normal.         Behavior: Behavior normal.         Thought Content: Thought content normal.         Judgment: Judgment normal.        Result Review :                 Assessment and Plan    Diagnoses and all orders for this visit:    1. Encounter for nonprocreative genetic counseling (Primary)  -     Fragile X Syndrome, PCR With Reflex to Southern Blot; Future      I spent 15 minutes caring for Brigitte on this date of service. This time " includes time spent by me in the following activities:performing a medically appropriate examination and/or evaluation , counseling and educating the patient/family/caregiver, ordering medications, tests, or procedures and documenting information in the medical record  Follow Up   Return if symptoms worsen or fail to improve, for Recheck, or sooner as needed.  Patient was given instructions and counseling regarding her condition or for health maintenance advice. Please see specific information pulled into the AVS if appropriate.   I will order fragile X syndrome PCR with reflex to Southern blot and patient is informed that she will be called with results, offered but declined additional lab testing such as CBC CMP lipids and TSH but again patient declines these.  Follow-up here as needed problems or as scheduled for chronic conditions.  Patient aware and in agreement to this plan  All questions and concerns addressed with understanding verbalized

## 2021-03-30 LAB
FMR1 GENE CGG RPT BLD/T QL: NORMAL
LABORATORY COMMENT REPORT: NORMAL

## 2022-01-05 ENCOUNTER — OFFICE VISIT (OUTPATIENT)
Dept: FAMILY MEDICINE CLINIC | Facility: CLINIC | Age: 32
End: 2022-01-05

## 2022-01-05 VITALS
HEIGHT: 63 IN | BODY MASS INDEX: 40.93 KG/M2 | WEIGHT: 231 LBS | HEART RATE: 88 BPM | DIASTOLIC BLOOD PRESSURE: 72 MMHG | OXYGEN SATURATION: 98 % | SYSTOLIC BLOOD PRESSURE: 106 MMHG

## 2022-01-05 DIAGNOSIS — M25.561 ACUTE PAIN OF BOTH KNEES: ICD-10-CM

## 2022-01-05 DIAGNOSIS — N93.9 VAGINAL BLEEDING, ABNORMAL: ICD-10-CM

## 2022-01-05 DIAGNOSIS — L40.9 SCALP PSORIASIS: Primary | ICD-10-CM

## 2022-01-05 DIAGNOSIS — M25.562 ACUTE PAIN OF BOTH KNEES: ICD-10-CM

## 2022-01-05 PROCEDURE — 99214 OFFICE O/P EST MOD 30 MIN: CPT | Performed by: NURSE PRACTITIONER

## 2022-01-05 RX ORDER — IBUPROFEN 600 MG/1
600 TABLET ORAL EVERY 6 HOURS PRN
Qty: 60 TABLET | Refills: 2 | Status: SHIPPED | OUTPATIENT
Start: 2022-01-05 | End: 2023-03-30

## 2022-01-05 RX ORDER — DESONIDE 0.5 MG/G
1 OINTMENT TOPICAL 2 TIMES DAILY
Qty: 60 G | Refills: 5 | Status: SHIPPED | OUTPATIENT
Start: 2022-01-05 | End: 2023-03-30

## 2022-01-05 NOTE — PROGRESS NOTES
Chief Complaint  Knee Pain (Both knees experienceing joint pain.) and Psoriasis (Wanting something for psoriasis)    Subjective          Brigitte Go presents to [facility] [place]  History of Present Illness    Patient verbalized consent to the visit recording.    The patient presents today for complaints of joint pain.     She reports being the heaviest she has been (231lb), noting painful bilateral knees. The patient states stiff knees when going from sitting to standing. She states having difficulty falling asleep, due to discomfort in her right knee. The patient also reports intermittent sharp pain in her hip. She reports the discomfort has been present for 2 months. The patient denies taking any pain medication. She denies having pain with ambulating, or with flexion. The patient notes having knee pain when doing steps. She states she ambulates 2 miles a day, which has been beneficial. The patient states the pain is worse in the afternoon. She notes the pain is in the anterior aspect of her right knee.     She reports the spray medication prescribed for her scalp psoriasis is not covered by her insurance although it did work well for her. The patient notes the psoriasis is only on her scalp. She notes she has tried olux and desonide, years ago, which was beneficial. The patient notes the psoriasis is getting worse on her scalp. She adds that she has tried using baby shampoo. She reports having the psoriasis for 11 years now.    She notes seeing a gynecologist. She notes having missed menstrual cycles. The patient reports that she is confused due to constantly bleeding vaginally, noting having blood when she wipes after going to the bathroom. She adds that she has to wear a pad. She adds that she has been bleeding for approximately 4 months. The patient reports that she has had an IUD placed 6 years ago, by the health department. The patient reports a history  "with bacterial infections. She reports having an ultrasound, due to concerns of having a cyst.     The patient notes she has 3 children, ages 11, 10, and 6.     The patient notes not being good with her diet, noting she tried low carbohydrates. She reports when she tried low carbohydrates diet, the bleeding started. The patient now is trying moderate carbohydrates, with having bleeding again. She states when eating a higher carbohydrate diet, the bleeding had stopped. The patient reports a gynecologist, but she was not having the bleeding at that time. She inquires about having a hormonal imbalance, causing the bleeding.    She refused the influenza vaccine today, reporting she never has received one, and has not had the flu. The patient states she does not plan on getting the COVID-19 vaccine. She also refused the tetanus shot.    The following portions of the patient's history were reviewed and updated as appropriate: allergies, current medications, past family history, past medical history, past social history, past surgical history and problem list.    Objective   Vital Signs:   /72   Pulse 88   Ht 160 cm (63\")   Wt 105 kg (231 lb)   SpO2 98%   BMI 40.92 kg/m²     Physical Exam  Vitals and nursing note reviewed.   Constitutional:       General: She is not in acute distress.     Appearance: Normal appearance. She is obese. She is not ill-appearing, toxic-appearing or diaphoretic.   HENT:      Head: Normocephalic and atraumatic.      Right Ear: External ear normal.      Left Ear: External ear normal.      Nose: Nose normal.      Mouth/Throat:      Mouth: Mucous membranes are moist.   Eyes:      Conjunctiva/sclera: Conjunctivae normal.   Cardiovascular:      Rate and Rhythm: Normal rate and regular rhythm.      Pulses: Normal pulses.      Heart sounds: Normal heart sounds. No murmur heard.  No friction rub. No gallop.    Pulmonary:      Effort: Pulmonary effort is normal. No respiratory distress.      " Breath sounds: Normal breath sounds. No stridor. No wheezing, rhonchi or rales.   Musculoskeletal:         General: Tenderness present.      Cervical back: Neck supple.      Right knee: Bony tenderness and crepitus present. No swelling. Normal range of motion. Tenderness present. Normal alignment.      Instability Tests: Anterior drawer test negative. Posterior drawer test negative. Anterior Lachman test negative.      Left knee: Bony tenderness present. No swelling or crepitus. Normal range of motion. Tenderness present. Normal alignment.      Instability Tests: Anterior drawer test negative. Posterior drawer test negative. Anterior Lachman test negative.      Comments: Some mild crepitus noted to palpation of the right knee; however, she has full range of motion to both knees. Minimal pain to palpation anteriorly bilaterally.  Ambulatory without assistance, gait not guarded.   Skin:     General: Skin is warm and dry.      Coloration: Skin is not jaundiced or pale.      Findings: Erythema and rash present. No bruising or lesion. Rash is crusting, scaling and urticarial.             Comments: She does have psoriasis, a fairly large plaque like patch to the occipital scalp.    Neurological:      General: No focal deficit present.      Mental Status: She is alert and oriented to person, place, and time.   Psychiatric:         Mood and Affect: Mood normal.         Behavior: Behavior normal.         Thought Content: Thought content normal.         Judgment: Judgment normal.        Result Review :       Data reviewed: Radiologic studies xrays of knees reviewed           Assessment and Plan    Diagnoses and all orders for this visit:    1. Scalp psoriasis (Primary)    2. Acute pain of both knees        -     I will obtain x-rays of bilateral knees. I will inform her of results by phone and if her symptoms should persist, consider PT/orthopedic referral. I will also give her ibuprofen 600 mg 3 times per day as needed for  pain. She is advised rest and ice as well. She is also prescribed desonide as above. If her symptoms should persist again, she will let me know.  -     XR Knee 1 or 2 View Bilateral; Future    3. Vaginal bleeding, abnormal        -     She is offered a referral on to GYN for further investigation of abnormal vaginal bleeding. She wants to wait on that at this time and will call back if she needs a referral once she decides she wants to see.  Comments:  has had IUD x 6 years , thinks she has mirena     Other orders  -     desonide (DESOWEN) 0.05 % ointment; Apply 1 application topically to the appropriate area as directed 2 (Two) Times a Day.  Dispense: 60 g; Refill: 5  -     ibuprofen (ADVIL,MOTRIN) 600 MG tablet; Take 1 tablet by mouth Every 6 (Six) Hours As Needed for Mild Pain .  Dispense: 60 tablet; Refill: 2      I spent 33 minutes caring for Brigitte on this date of service. This time includes time spent by me in the following activities:preparing for the visit, reviewing tests, performing a medically appropriate examination and/or evaluation , counseling and educating the patient/family/caregiver, ordering medications, tests, or procedures and documenting information in the medical record  Follow Up   Return in about 3 months (around 4/5/2022), or if symptoms worsen or fail to improve, for Recheck, or sooner as needed.  Patient was given instructions and counseling regarding her condition or for health maintenance advice. Please see specific information pulled into the AVS if appropriate.     Transcribed from ambient dictation for LUNA Pitt by Sarah Lainez.  01/05/22   13:39 CST  Answers for HPI/ROS submitted by the patient on 1/3/2022  Please describe your symptoms.: Joint stiffness in knees, aching, random hip and foot pain (right side only), psoriasis has worsened.  Have you had these symptoms before?: Yes  How long have you been having these symptoms?: Greater than 2 weeks  Please list any  medications you are currently taking for this condition.: None  Please describe any probable cause for these symptoms. : I’ve has psoriasis for 11 years. I’m wondering if its not psoriatic arthritis.  What is the primary reason for your visit?: Other

## 2022-01-10 DIAGNOSIS — M25.562 ACUTE PAIN OF BOTH KNEES: Primary | ICD-10-CM

## 2022-01-10 DIAGNOSIS — M25.561 ACUTE PAIN OF BOTH KNEES: Primary | ICD-10-CM

## 2022-03-10 ENCOUNTER — OFFICE VISIT (OUTPATIENT)
Dept: OBSTETRICS AND GYNECOLOGY | Facility: CLINIC | Age: 32
End: 2022-03-10

## 2022-03-10 ENCOUNTER — LAB (OUTPATIENT)
Dept: LAB | Facility: OTHER | Age: 32
End: 2022-03-10

## 2022-03-10 VITALS
HEIGHT: 63 IN | WEIGHT: 235 LBS | SYSTOLIC BLOOD PRESSURE: 122 MMHG | DIASTOLIC BLOOD PRESSURE: 68 MMHG | BODY MASS INDEX: 41.64 KG/M2

## 2022-03-10 DIAGNOSIS — Z30.431 IUD CHECK UP: ICD-10-CM

## 2022-03-10 DIAGNOSIS — N93.9 ABNORMAL UTERINE BLEEDING: ICD-10-CM

## 2022-03-10 DIAGNOSIS — Z12.4 CERVICAL CANCER SCREENING: ICD-10-CM

## 2022-03-10 DIAGNOSIS — N93.9 ABNORMAL UTERINE BLEEDING: Primary | ICD-10-CM

## 2022-03-10 LAB
BASOPHILS # BLD AUTO: 0.02 10*3/MM3 (ref 0–0.2)
BASOPHILS NFR BLD AUTO: 0.2 % (ref 0–1.5)
DEPRECATED RDW RBC AUTO: 45.5 FL (ref 37–54)
EOSINOPHIL # BLD AUTO: 0.34 10*3/MM3 (ref 0–0.4)
EOSINOPHIL NFR BLD AUTO: 3.2 % (ref 0.3–6.2)
ERYTHROCYTE [DISTWIDTH] IN BLOOD BY AUTOMATED COUNT: 14.6 % (ref 12.3–15.4)
HCT VFR BLD AUTO: 35.9 % (ref 34–46.6)
HGB BLD-MCNC: 11.5 G/DL (ref 12–15.9)
LYMPHOCYTES # BLD AUTO: 2.19 10*3/MM3 (ref 0.7–3.1)
LYMPHOCYTES NFR BLD AUTO: 20.7 % (ref 19.6–45.3)
MCH RBC QN AUTO: 27.9 PG (ref 26.6–33)
MCHC RBC AUTO-ENTMCNC: 32 G/DL (ref 31.5–35.7)
MCV RBC AUTO: 87.1 FL (ref 79–97)
MONOCYTES # BLD AUTO: 0.63 10*3/MM3 (ref 0.1–0.9)
MONOCYTES NFR BLD AUTO: 5.9 % (ref 5–12)
NEUTROPHILS NFR BLD AUTO: 7.41 10*3/MM3 (ref 1.7–7)
NEUTROPHILS NFR BLD AUTO: 70 % (ref 42.7–76)
PLATELET # BLD AUTO: 313 10*3/MM3 (ref 140–450)
PMV BLD AUTO: 9 FL (ref 6–12)
RBC # BLD AUTO: 4.12 10*6/MM3 (ref 3.77–5.28)
WBC NRBC COR # BLD: 10.59 10*3/MM3 (ref 3.4–10.8)

## 2022-03-10 PROCEDURE — 99204 OFFICE O/P NEW MOD 45 MIN: CPT | Performed by: OBSTETRICS & GYNECOLOGY

## 2022-03-10 PROCEDURE — 85025 COMPLETE CBC W/AUTO DIFF WBC: CPT | Performed by: OBSTETRICS & GYNECOLOGY

## 2022-03-10 PROCEDURE — 84443 ASSAY THYROID STIM HORMONE: CPT | Performed by: OBSTETRICS & GYNECOLOGY

## 2022-03-10 NOTE — PROGRESS NOTES
[facility]  Gynecology  Date of Service: 03/10/2022    CC: IUD check, bleeding concerns    HPI  Brigitte Go is a 31 y.o.  premenopausal female who presents with complaints of bleeding concerns and for IUD check.      She had a ParaGard IUD [facility]  She states that since , she has had irregular heavy bleeding.  She states that sometimes her bleeding is so heavy that it will soak through her jeans.  On her heaviest days, she will go through pads continuously but she doesn't know how often.  During her period, she will have some back pain as well.  She declines hormonal treatment as she has side effects associated.    ROS  Review of Systems   Constitutional: Negative.    HENT: Negative.    Eyes: Negative.    Respiratory: Negative.    Cardiovascular: Negative.    Gastrointestinal: Negative.    Endocrine: Negative.    Genitourinary: Positive for menstrual problem, pelvic pain and vaginal bleeding.   Musculoskeletal: Positive for back pain.   Skin: Negative.    Allergic/Immunologic: Negative.    Neurological: Negative.    Hematological: Negative.    Psychiatric/Behavioral: Negative.      GYN HISTORY  Menarche: age 10  Menses: See HPI  History of STIs: None  Last pap smear: unknown  Abnormal pap smear history: None  Contraception: None     OB HISTORY  OB History    Para Term  AB Living   3 3 3     3   SAB IAB Ectopic Molar Multiple Live Births                    # Outcome Date GA Lbr Chepe/2nd Weight Sex Delivery Anes PTL Lv   3 Term            2 Term            1 Term              PAST MEDICAL HISTORY  History reviewed. No pertinent past medical history.    PAST SURGICAL HISTORY  Past Surgical History:   Procedure Laterality Date   • DILATION AND CURETTAGE, DIAGNOSTIC / THERAPEUTIC  10/07/2015    Manual extraction of placenta and dilatation and curettage postpartum     FAMILY HISTORY  Family History   Problem Relation Age of Onset  "  • Diabetes Other    • No Known Problems Mother    • Heart disease Father    • Heart disease Brother    • Thyroid disease Brother      SOCIAL HISTORY  Social History     Socioeconomic History   • Marital status: Single   Tobacco Use   • Smoking status: Never Smoker   • Smokeless tobacco: Never Used   Substance and Sexual Activity   • Alcohol use: No   • Drug use: No   • Sexual activity: Yes     Partners: Male     Birth control/protection: I.U.D.     ALLERGIES  No Known Allergies    HOME MEDICATIONS  Prior to Admission medications    Medication Sig Start Date End Date Taking? Authorizing Provider   desonide (DESOWEN) 0.05 % ointment Apply 1 application topically to the appropriate area as directed 2 (Two) Times a Day. 1/5/22   Kaylyn Cabrera APRN   ibuprofen (ADVIL,MOTRIN) 600 MG tablet Take 1 tablet by mouth Every 6 (Six) Hours As Needed for Mild Pain . 1/5/22   Sharon Cabrerai L, APRN     PE  /68   Ht 160 cm (63\")   Wt 107 kg (235 lb)   BMI 41.63 kg/m²        General: Alert, healthy, no distress, well nourished and well developed.  Neurologic: Alert, oriented to person, place, and time.  Gait normal.  Cranial nerves II-XII grossly intact.  HEENT: Normocephalic, atraumatic.  Extraocular muscles intact, pupils equal and reactive times two.    Neck: Supple, no adenopathy, thyroid normal size, non-tender, without nodularity, trachea midline.  Lungs: Normal respiratory effort.  Clear to auscultation bilaterally.  No wheezes, rhonci, or rales.  Heart: Regular rate and rhythm.  No murmer, rub or gallop.  Abdomen: Soft, non-tender, non-distended,no masses, no hepatosplenomegaly, no hernia.  Skin: No rash, no lesions.  Extremities: No cyanosis, clubbing or edema.  PELVIC EXAM:  External Genitalia/Vulva: Anatomy is normal, no significant redness of labia, no discharge on vulvar tissues, Boyce's and Bartholin's glands are normal, no ulcers, no condylomatous lesions.  Urethral meatus: Normal, no lesions, no " prolapse.  Urethra: Normal, no masses, no tenderness with palpation.  Bladder: Normal, no fullness, no masses, no tenderness with palpation.  Vagina: Vaginal tissues are not inflamed, normal color and texture, no significant discharge present.  Pelvic support adequate.  Cervix: Large (would require extra large V-care).  Normal, no lesions, no purulent discharge, no cervical motion tenderness.  IUD strings visualized.  Uterus: Normal size, shape, and consistency.  Good mobility noted.  Minimal descent noted with good support.  Adnexa: Normal size and shape bilaterally, no palpable mass bilaterally and non-tender bilaterally.  Rectal: Normal, no masses or polyps, confirms bimanual exam, perianal normal, no lesions; ARIEL deferred.    IMPRESSION  Brigitte Go is a 31 y.o.  presenting with AUB with ParaGard IUD in place.  Discussed that ParaGard IUDs can make bleeding heavier or there could be other etiologies.  Will obtain w/u to evaluate for etiologies.  She declines medical management at this time.  She is leaning towards definitive surgical management.    PLAN    1. Abnormal uterine bleeding  Discussed options for management of AUB including expectant management, medical management (OCPs, POPs, DepoProvera, Nexplanon, Mirena IUD), and surgical management (endometrial ablation, hysterectomy).  Reviewed pros/cons of each.  Reviewed that Mirena IUD has only local effects of progesterone w/o systemic effects.  Discussed an ablation may or may not be an option pending US.  Pending results and patient's decision, RTC for EMB.  - CBC & Differential; Future  - TSH; Future  - US Non-ob Transvaginal; Future    2. IUD check up  IUD strings visualized.    3. Cervical cancer screening  - Liquid-based Pap Smear, Screening; Future  - Liquid-based Pap Smear, Screening    This document has been electronically signed by Riri Capone MD on March 10, 2022 10:46 CST.

## 2022-03-11 LAB — TSH SERPL DL<=0.05 MIU/L-ACNC: 2.43 UIU/ML (ref 0.27–4.2)

## 2022-03-16 LAB
LAB AP CASE REPORT: NORMAL
PATH INTERP SPEC-IMP: NORMAL

## 2022-04-04 ENCOUNTER — TELEPHONE (OUTPATIENT)
Dept: OBSTETRICS AND GYNECOLOGY | Facility: CLINIC | Age: 32
End: 2022-04-04

## 2022-04-04 NOTE — TELEPHONE ENCOUNTER
----- Message from Riri Capone MD sent at 4/2/2022  9:02 AM CDT -----  Please let her know that US is normal and Paragard IUD is in the right place. Given that her US is normal and her labs are fairly normal other than slightly anemic, she can opt for medical management of her periods with hormonal treatment of some kind, an ablation, or a hysterectomy. If she desires surgery, she will need to come in for EMB. If she wants an ablation, we should do a tubal at the same time as she should not get pregnant after having an ablation.

## 2022-04-04 NOTE — TELEPHONE ENCOUNTER
"Called and spoke with patient, notified of results and discussed dr khan recommendations for management of periods.  Patient verbalized understanding and states she is unsure what she wants to do, states she \"does not want to take birth control but is unsure if she wants to proceed with surgery\"  Advised patient to contact the office once she has decided.  Patient verbalized understanding  "

## 2022-09-06 RX ORDER — IBUPROFEN 600 MG/1
600 TABLET ORAL EVERY 6 HOURS PRN
Qty: 60 TABLET | Refills: 2 | OUTPATIENT
Start: 2022-09-06

## 2022-09-06 RX ORDER — DESONIDE 0.5 MG/G
1 OINTMENT TOPICAL 2 TIMES DAILY
Qty: 60 G | Refills: 5 | OUTPATIENT
Start: 2022-09-06

## 2023-03-30 ENCOUNTER — LAB (OUTPATIENT)
Dept: LAB | Facility: OTHER | Age: 33
End: 2023-03-30
Payer: COMMERCIAL

## 2023-03-30 ENCOUNTER — OFFICE VISIT (OUTPATIENT)
Dept: FAMILY MEDICINE CLINIC | Facility: CLINIC | Age: 33
End: 2023-03-30
Payer: COMMERCIAL

## 2023-03-30 VITALS
SYSTOLIC BLOOD PRESSURE: 126 MMHG | BODY MASS INDEX: 44.05 KG/M2 | HEART RATE: 99 BPM | DIASTOLIC BLOOD PRESSURE: 82 MMHG | HEIGHT: 63 IN | OXYGEN SATURATION: 99 % | WEIGHT: 248.6 LBS

## 2023-03-30 DIAGNOSIS — L72.3 SEBACEOUS CYST: ICD-10-CM

## 2023-03-30 DIAGNOSIS — L98.9 SKIN LESION: ICD-10-CM

## 2023-03-30 DIAGNOSIS — L40.9 SCALP PSORIASIS: Primary | Chronic | ICD-10-CM

## 2023-03-30 DIAGNOSIS — R53.83 OTHER FATIGUE: ICD-10-CM

## 2023-03-30 DIAGNOSIS — M25.551 RIGHT HIP PAIN: ICD-10-CM

## 2023-03-30 LAB
ALBUMIN SERPL-MCNC: 4.2 G/DL (ref 3.5–5)
ALBUMIN/GLOB SERPL: 1.2 G/DL (ref 1.1–1.8)
ALP SERPL-CCNC: 77 U/L (ref 38–126)
ALT SERPL W P-5'-P-CCNC: 71 U/L
ANION GAP SERPL CALCULATED.3IONS-SCNC: 7 MMOL/L (ref 5–15)
AST SERPL-CCNC: 45 U/L (ref 14–36)
BASOPHILS # BLD AUTO: 0.02 10*3/MM3 (ref 0–0.2)
BASOPHILS NFR BLD AUTO: 0.2 % (ref 0–1.5)
BILIRUB SERPL-MCNC: 0.5 MG/DL (ref 0.2–1.3)
BUN SERPL-MCNC: 16 MG/DL (ref 7–23)
BUN/CREAT SERPL: 19.3 (ref 7–25)
CALCIUM SPEC-SCNC: 9 MG/DL (ref 8.4–10.2)
CHLORIDE SERPL-SCNC: 102 MMOL/L (ref 101–112)
CO2 SERPL-SCNC: 30 MMOL/L (ref 22–30)
CREAT SERPL-MCNC: 0.83 MG/DL (ref 0.52–1.04)
DEPRECATED RDW RBC AUTO: 43.8 FL (ref 37–54)
EGFRCR SERPLBLD CKD-EPI 2021: 95.6 ML/MIN/1.73
EOSINOPHIL # BLD AUTO: 0.28 10*3/MM3 (ref 0–0.4)
EOSINOPHIL NFR BLD AUTO: 3 % (ref 0.3–6.2)
ERYTHROCYTE [DISTWIDTH] IN BLOOD BY AUTOMATED COUNT: 14.1 % (ref 12.3–15.4)
GLOBULIN UR ELPH-MCNC: 3.6 GM/DL (ref 2.3–3.5)
GLUCOSE SERPL-MCNC: 183 MG/DL (ref 70–99)
HCT VFR BLD AUTO: 37.4 % (ref 34–46.6)
HGB BLD-MCNC: 11.9 G/DL (ref 12–15.9)
LYMPHOCYTES # BLD AUTO: 1.86 10*3/MM3 (ref 0.7–3.1)
LYMPHOCYTES NFR BLD AUTO: 20 % (ref 19.6–45.3)
MCH RBC QN AUTO: 27.7 PG (ref 26.6–33)
MCHC RBC AUTO-ENTMCNC: 31.8 G/DL (ref 31.5–35.7)
MCV RBC AUTO: 87 FL (ref 79–97)
MONOCYTES # BLD AUTO: 0.37 10*3/MM3 (ref 0.1–0.9)
MONOCYTES NFR BLD AUTO: 4 % (ref 5–12)
NEUTROPHILS NFR BLD AUTO: 6.75 10*3/MM3 (ref 1.7–7)
NEUTROPHILS NFR BLD AUTO: 72.8 % (ref 42.7–76)
PLATELET # BLD AUTO: 339 10*3/MM3 (ref 140–450)
PMV BLD AUTO: 9.6 FL (ref 6–12)
POTASSIUM SERPL-SCNC: 3.9 MMOL/L (ref 3.4–5)
PROT SERPL-MCNC: 7.8 G/DL (ref 6.3–8.6)
RBC # BLD AUTO: 4.3 10*6/MM3 (ref 3.77–5.28)
SODIUM SERPL-SCNC: 139 MMOL/L (ref 137–145)
WBC NRBC COR # BLD: 9.28 10*3/MM3 (ref 3.4–10.8)

## 2023-03-30 PROCEDURE — 83002 ASSAY OF GONADOTROPIN (LH): CPT | Performed by: NURSE PRACTITIONER

## 2023-03-30 PROCEDURE — 1160F RVW MEDS BY RX/DR IN RCRD: CPT | Performed by: NURSE PRACTITIONER

## 2023-03-30 PROCEDURE — 1159F MED LIST DOCD IN RCRD: CPT | Performed by: NURSE PRACTITIONER

## 2023-03-30 PROCEDURE — 82607 VITAMIN B-12: CPT | Performed by: NURSE PRACTITIONER

## 2023-03-30 PROCEDURE — 82728 ASSAY OF FERRITIN: CPT | Performed by: NURSE PRACTITIONER

## 2023-03-30 PROCEDURE — 84466 ASSAY OF TRANSFERRIN: CPT | Performed by: NURSE PRACTITIONER

## 2023-03-30 PROCEDURE — 83001 ASSAY OF GONADOTROPIN (FSH): CPT | Performed by: NURSE PRACTITIONER

## 2023-03-30 PROCEDURE — 82672 ASSAY OF ESTROGEN: CPT | Performed by: NURSE PRACTITIONER

## 2023-03-30 PROCEDURE — 80050 GENERAL HEALTH PANEL: CPT | Performed by: NURSE PRACTITIONER

## 2023-03-30 PROCEDURE — 82746 ASSAY OF FOLIC ACID SERUM: CPT | Performed by: NURSE PRACTITIONER

## 2023-03-30 PROCEDURE — 83540 ASSAY OF IRON: CPT | Performed by: NURSE PRACTITIONER

## 2023-03-30 PROCEDURE — 82306 VITAMIN D 25 HYDROXY: CPT | Performed by: NURSE PRACTITIONER

## 2023-03-30 PROCEDURE — 99214 OFFICE O/P EST MOD 30 MIN: CPT | Performed by: NURSE PRACTITIONER

## 2023-03-30 RX ORDER — CALCIPOTRIENE 50 UG/G
1 AEROSOL, FOAM TOPICAL DAILY
Qty: 120 G | Refills: 5 | Status: SHIPPED | OUTPATIENT
Start: 2023-03-30

## 2023-03-30 NOTE — PROGRESS NOTES
CC: Rash (Wanting different meds for scoraisis ), Fatigue (Wanting vit checked, wanting hormones checked.), and Hip Pain (Right side deep pain)      Subjective:  Brigitte Go is a 32 y.o. female who presents for         Patient is LUNA Mirza presents to office today with several complaints.  She states she has psoriasis and she wants a different ointment for this. She states her insurance will cover Sorilux topical foam.  She also complains of fatigue.  This is been ongoing for several months now.  She is wanting vitamin levels checked and her hormones checked. Her periods are irregular. States she has had her period for a year in the past. Then it will come every several months. She states she has seen GYN for in the past.She states it is hard to stay awake during the day. States gets at least 6 hours of sleep per night. States a lot of nights, her sleep is broken. She does take naps on occasion. She doesn't know if she snores or not. She has never woken from sleep gasping for air. She c/o Rt hip pain that is deep. The pain is sharp and intermittent. She states she has tried to lose weight without resolution to see if this would help her pain. She has a lesion on her lt hand that will drain, but won't heal.  She also c/o knot on her left shoulder that is getting bigger. It doesn't hurt or drain.       The following portions of the patient's history were reviewed and updated as appropriate: allergies, current medications, past family history, past medical history, past social history, past surgical history and problem list.    History reviewed. No pertinent past medical history.      Current Outpatient Medications:   •  Calcipotriene (Sorilux) 0.005 % foam, Apply 1 application topically Daily., Disp: 120 g, Rfl: 5    Review of Systems    Review of Systems   Constitutional: Positive for fatigue.   HENT: Negative.    Eyes: Negative.    Respiratory: Negative.    Cardiovascular: Negative.   "  Gastrointestinal: Negative.    Endocrine: Negative.    Genitourinary: Negative.    Musculoskeletal: Positive for arthralgias.   Skin: Positive for rash.        Skin lesion   Allergic/Immunologic: Negative.    Neurological: Negative.    Hematological: Negative.    Psychiatric/Behavioral: Negative.    All other systems reviewed and are negative.      Objective  Vitals:    03/30/23 1048   BP: 126/82   Pulse: 99   SpO2: 99%   Weight: 113 kg (248 lb 9.6 oz)   Height: 160 cm (63\")     Body mass index is 44.04 kg/m².    Physical Exam    Physical Exam  Vitals and nursing note reviewed.   Constitutional:       General: She is not in acute distress.     Appearance: Normal appearance. She is not ill-appearing, toxic-appearing or diaphoretic.   HENT:      Head: Normocephalic and atraumatic.   Cardiovascular:      Rate and Rhythm: Normal rate and regular rhythm.      Pulses: Normal pulses.      Heart sounds: Normal heart sounds. No murmur heard.    No friction rub. No gallop.   Pulmonary:      Effort: Pulmonary effort is normal. No respiratory distress.      Breath sounds: Normal breath sounds. No stridor. No wheezing, rhonchi or rales.   Chest:      Chest wall: No tenderness.   Musculoskeletal:         General: No swelling, tenderness, deformity or signs of injury. Normal range of motion.      Cervical back: Normal range of motion and neck supple.      Comments: Pt with full ROM of the Left hip.    Skin:     General: Skin is warm and dry.      Comments: Psoriasis noted to scalp.  Sebaceous cyst noted to left shoulder  Skin lesion noted to left hand   Neurological:      Mental Status: She is alert.             Diagnoses and all orders for this visit:    1. Scalp psoriasis (Primary)  -     Calcipotriene (Sorilux) 0.005 % foam; Apply 1 application topically Daily.  Dispense: 120 g; Refill: 5  -     Ambulatory Referral to Dermatology    2. Sebaceous cyst  -     Ambulatory Referral to Dermatology    3. Skin lesion  -     " Ambulatory Referral to Dermatology    4. Right hip pain  -     XR Hip With or Without Pelvis 2 - 3 View Right; Future    5. Other fatigue  -     CBC w AUTO Differential; Future  -     Comprehensive metabolic panel; Future  -     TSH  -     Iron and TIBC; Future  -     Ferritin; Future  -     Folate; Future  -     Vitamin B12; Future  -     Vitamin D 25 Hydroxy; Future  -     Estrogens, Total; Future  -     FSH & LH; Future       Patient in with several complaints today.  She has scalp psoriasis we will send in Sorilux foam for her to use daily.  She also has a sebaceous cyst of the left shoulder and a skin lesion of the left hand we will send her to dermatology for further evaluation and treatment of the psoriasis, the sebaceous cyst, and the skin lesion.  She also complains of right hip pain.  We will obtain an x-ray of the hip on patient's way out.  We will notify her of these results once they are available.  She also complains of chronic fatigue.  We will check labs to further evaluate possible causes of the fatigue.  We will notify her of these results once they are available.  She is to follow-up as needed with her PCP LUNA Mirza for an annual physical exam with labs.  Answered all questions.  Patient verbalized understanding of plan of care.          This document has been electronically signed by LUNA Cloud on March 30, 2023 15:46 CDT

## 2023-03-31 LAB
25(OH)D3 SERPL-MCNC: 24 NG/ML (ref 30–100)
FERRITIN SERPL-MCNC: 55.9 NG/ML (ref 13–150)
FOLATE SERPL-MCNC: 16.4 NG/ML (ref 4.78–24.2)
FSH SERPL-ACNC: 3.68 MIU/ML
IRON 24H UR-MRATE: 53 MCG/DL (ref 37–145)
IRON SATN MFR SERPL: 13 % (ref 20–50)
LH SERPL-ACNC: 10.5 MIU/ML
TIBC SERPL-MCNC: 419 MCG/DL (ref 298–536)
TRANSFERRIN SERPL-MCNC: 281 MG/DL (ref 200–360)
TSH SERPL DL<=0.05 MIU/L-ACNC: 2.79 UIU/ML (ref 0.27–4.2)
VIT B12 BLD-MCNC: 711 PG/ML (ref 211–946)

## 2023-04-03 DIAGNOSIS — R74.8 ELEVATED LIVER ENZYMES: Primary | ICD-10-CM

## 2023-04-03 RX ORDER — ERGOCALCIFEROL 1.25 MG/1
50000 CAPSULE ORAL WEEKLY
Qty: 4 CAPSULE | Refills: 5 | Status: SHIPPED | OUTPATIENT
Start: 2023-04-03

## 2023-04-04 ENCOUNTER — PRIOR AUTHORIZATION (OUTPATIENT)
Dept: FAMILY MEDICINE CLINIC | Facility: CLINIC | Age: 33
End: 2023-04-04
Payer: COMMERCIAL

## 2023-04-04 NOTE — TELEPHONE ENCOUNTER
Pa for Sorilux foam was submitted to covermymeds.com ref PA Caro [id number]. DX used L40.9 Scalp Psoriasis.     PA for Sorilux was APPROVED effective until 4/3/24. Patient advised.

## 2023-04-05 LAB — ESTROGEN SERPL-MCNC: 147 PG/ML

## 2023-05-04 ENCOUNTER — LAB (OUTPATIENT)
Dept: LAB | Facility: OTHER | Age: 33
End: 2023-05-04
Payer: COMMERCIAL

## 2023-05-04 DIAGNOSIS — R74.8 ELEVATED LIVER ENZYMES: ICD-10-CM

## 2023-05-04 DIAGNOSIS — R74.8 ELEVATED LIVER ENZYMES: Primary | ICD-10-CM

## 2023-05-04 LAB
ALBUMIN SERPL-MCNC: 4 G/DL (ref 3.5–5)
ALBUMIN/GLOB SERPL: 1.1 G/DL (ref 1.1–1.8)
ALP SERPL-CCNC: 74 U/L (ref 38–126)
ALT SERPL W P-5'-P-CCNC: 86 U/L
ANION GAP SERPL CALCULATED.3IONS-SCNC: 7 MMOL/L (ref 5–15)
AST SERPL-CCNC: 56 U/L (ref 14–36)
BILIRUB SERPL-MCNC: 0.6 MG/DL (ref 0.2–1.3)
BUN SERPL-MCNC: 15 MG/DL (ref 7–23)
BUN/CREAT SERPL: 17.2 (ref 7–25)
CALCIUM SPEC-SCNC: 9.1 MG/DL (ref 8.4–10.2)
CHLORIDE SERPL-SCNC: 105 MMOL/L (ref 101–112)
CO2 SERPL-SCNC: 26 MMOL/L (ref 22–30)
CREAT SERPL-MCNC: 0.87 MG/DL (ref 0.52–1.04)
EGFRCR SERPLBLD CKD-EPI 2021: 90.3 ML/MIN/1.73
GLOBULIN UR ELPH-MCNC: 3.6 GM/DL (ref 2.3–3.5)
GLUCOSE SERPL-MCNC: 127 MG/DL (ref 70–99)
POTASSIUM SERPL-SCNC: 4.2 MMOL/L (ref 3.4–5)
PROT SERPL-MCNC: 7.6 G/DL (ref 6.3–8.6)
SODIUM SERPL-SCNC: 138 MMOL/L (ref 137–145)

## 2023-05-04 PROCEDURE — 80053 COMPREHEN METABOLIC PANEL: CPT | Performed by: NURSE PRACTITIONER

## 2023-05-18 ENCOUNTER — LAB (OUTPATIENT)
Dept: LAB | Facility: OTHER | Age: 33
End: 2023-05-18
Payer: COMMERCIAL

## 2023-05-18 DIAGNOSIS — R74.8 ELEVATED LIVER ENZYMES: ICD-10-CM

## 2023-05-18 LAB
HAV IGM SERPL QL IA: NORMAL
HBV CORE IGM SERPL QL IA: NORMAL
HBV SURFACE AG SERPL QL IA: NORMAL
HCV AB SER DONR QL: NORMAL

## 2023-05-18 PROCEDURE — 80074 ACUTE HEPATITIS PANEL: CPT | Performed by: NURSE PRACTITIONER

## 2023-08-09 ENCOUNTER — PROCEDURE VISIT (OUTPATIENT)
Dept: OBSTETRICS AND GYNECOLOGY | Facility: CLINIC | Age: 33
End: 2023-08-09
Payer: COMMERCIAL

## 2023-08-09 VITALS
HEIGHT: 63 IN | WEIGHT: 245 LBS | BODY MASS INDEX: 43.41 KG/M2 | DIASTOLIC BLOOD PRESSURE: 74 MMHG | SYSTOLIC BLOOD PRESSURE: 116 MMHG

## 2023-08-09 DIAGNOSIS — Z30.433 ENCOUNTER FOR REMOVAL AND REINSERTION OF INTRAUTERINE CONTRACEPTIVE DEVICE (IUD): Primary | ICD-10-CM

## 2023-08-09 DIAGNOSIS — Z32.02 NEGATIVE PREGNANCY TEST: ICD-10-CM

## 2023-08-09 LAB
B-HCG UR QL: NEGATIVE
EXPIRATION DATE: NORMAL
INTERNAL NEGATIVE CONTROL: NEGATIVE
INTERNAL POSITIVE CONTROL: POSITIVE
Lab: NORMAL

## 2023-08-09 RX ORDER — CLOBETASOL PROPIONATE 0.46 MG/ML
SOLUTION TOPICAL
COMMUNITY

## 2023-08-09 RX ORDER — FLUOCINOLONE ACETONIDE 0.11 MG/ML
OIL TOPICAL
COMMUNITY

## 2023-08-09 NOTE — PROGRESS NOTES
IUD Removal and Immediate Reinsertion    No LMP recorded. Patient has had an implant.    Date of procedure:  8/9/2023    Risks and benefits discussed? yes  All questions answered? yes  Consents given by the patient  Written consent obtained? yes  Reason for removal: Heavy periods        Procedure documentation:    A speculum was placed in order to view the cervix.  Cervical dilation did not need to be performed in order to access the string.  The IUD string was easily seen.  The string was grasped and the IUD was removed without difficulty.  The IUD did not appear to be adherent to the uterine cavity. It was removed intact.    The cervix was cleansed with an antiseptic solution.  The anterior lip of the cervix was grasped with a tenaculum and the uterine cavity was gently sounded.  There was mild difficulty passing the sound through the cervix.  Cervical dilation did not need to be performed prior to pacing the IUD.  The uterus was midposition and sounded to 7.5 cms.  The Mirena was then prepared per the manufacturers instructions.    The Mirena was advanced to a point 2 cms from the fundus and then the arms were released from the sheath.  The device was advanced to the fundus and the device was released fully from the sheath.. The string was cut 3 cms in length.  Bleeding from the cervix was scant.    She tolerated the procedure without any difficulty.     Mirena [id number]    Post procedure instructions: Call if any fever or excessive bleeding or pain.    Follow up needed: 6 weeks for IUD check up     This note was electronically signed.    LUNA Forman  August 9, 2023

## 2023-09-18 RX ORDER — ERGOCALCIFEROL 1.25 MG/1
50000 CAPSULE ORAL WEEKLY
Qty: 4 CAPSULE | Refills: 0 | OUTPATIENT
Start: 2023-09-18

## 2023-09-18 RX ORDER — ERGOCALCIFEROL 1.25 MG/1
50000 CAPSULE ORAL WEEKLY
Qty: 4 CAPSULE | Refills: 5 | Status: SHIPPED | OUTPATIENT
Start: 2023-09-18

## 2023-09-25 ENCOUNTER — OFFICE VISIT (OUTPATIENT)
Dept: OBSTETRICS AND GYNECOLOGY | Facility: CLINIC | Age: 33
End: 2023-09-25

## 2023-09-25 VITALS
WEIGHT: 244 LBS | BODY MASS INDEX: 43.23 KG/M2 | DIASTOLIC BLOOD PRESSURE: 82 MMHG | SYSTOLIC BLOOD PRESSURE: 124 MMHG | HEIGHT: 63 IN

## 2023-09-25 DIAGNOSIS — Z30.431 IUD CHECK UP: Primary | ICD-10-CM

## 2023-09-25 PROCEDURE — 99212 OFFICE O/P EST SF 10 MIN: CPT | Performed by: NURSE PRACTITIONER

## 2023-09-25 PROCEDURE — 1160F RVW MEDS BY RX/DR IN RCRD: CPT | Performed by: NURSE PRACTITIONER

## 2023-09-25 PROCEDURE — 1159F MED LIST DOCD IN RCRD: CPT | Performed by: NURSE PRACTITIONER

## 2023-09-25 NOTE — PROGRESS NOTES
Subjective   Brigitte Go is a 32 y.o. IUD check up    History of Present Illness  LMP: current  Pap: NIL, negative hrHPV- 03/2022  BC: Mirena    Pt presents for routine 6 week IUD check up.  She reports expected intermittent vaginal bleeding and denies any adverse side effects.      Gynecologic Exam  The patient's primary symptoms include vaginal bleeding. The patient's pertinent negatives include no genital itching, genital lesions, genital odor, genital rash, pelvic pain or vaginal discharge. The patient is experiencing no pain. She is not pregnant. Pertinent negatives include no abdominal pain, chills, constipation, diarrhea, dysuria, fever, flank pain, frequency, headaches, hematuria, rash or urgency. She uses an IUD for contraception.     The following portions of the patient's history were reviewed and updated as appropriate: allergies, current medications, past family history, past medical history, past social history, past surgical history, and problem list.    Review of Systems   Constitutional:  Negative for chills, diaphoresis, fatigue, fever and unexpected weight change.   Respiratory:  Negative for apnea, chest tightness and shortness of breath.    Cardiovascular:  Negative for chest pain and palpitations.   Gastrointestinal:  Negative for abdominal distention, abdominal pain, constipation and diarrhea.   Genitourinary:  Positive for vaginal bleeding. Negative for decreased urine volume, difficulty urinating, dysuria, enuresis, flank pain, frequency, genital sores, hematuria, pelvic pain, urgency, vaginal discharge and vaginal pain.   Skin:  Negative for rash.   Neurological:  Negative for headaches.   Psychiatric/Behavioral:  Negative for sleep disturbance and suicidal ideas.        Objective   Physical Exam  Exam conducted with a chaperone present.   Genitourinary:     General: Normal vulva.      Exam position: Lithotomy position.      Labia:         Right: No rash, tenderness, lesion or  injury.         Left: No rash, tenderness, lesion or injury.       Urethra: No prolapse, urethral pain, urethral swelling or urethral lesion.      Vagina: Normal.      Cervix: Normal.      Comments: IUD string length appropriate.  Moderate amount of blood present within vagina.          Assessment & Plan   Diagnoses and all orders for this visit:    1. IUD check up (Primary)      Normal GYN exam, IUD string length unchanged.  RTC in 1 year for annual gynecological exam or sooner if needed.